# Patient Record
Sex: FEMALE | Race: WHITE | NOT HISPANIC OR LATINO | Employment: UNEMPLOYED | URBAN - METROPOLITAN AREA
[De-identification: names, ages, dates, MRNs, and addresses within clinical notes are randomized per-mention and may not be internally consistent; named-entity substitution may affect disease eponyms.]

---

## 2017-04-10 ENCOUNTER — CONVERSION ENCOUNTER (OUTPATIENT)
Dept: GENERAL RADIOLOGY | Facility: HOSPITAL | Age: 47
End: 2017-04-10

## 2017-04-12 ENCOUNTER — CONVERSION ENCOUNTER (OUTPATIENT)
Dept: ULTRASOUND IMAGING | Facility: HOSPITAL | Age: 47
End: 2017-04-12

## 2018-02-28 ENCOUNTER — OFFICE VISIT CONVERTED (OUTPATIENT)
Dept: ORTHOPEDIC SURGERY | Facility: CLINIC | Age: 48
End: 2018-02-28
Attending: ORTHOPAEDIC SURGERY

## 2018-07-25 ENCOUNTER — CONVERSION ENCOUNTER (OUTPATIENT)
Dept: GENERAL RADIOLOGY | Facility: HOSPITAL | Age: 48
End: 2018-07-25

## 2019-02-17 ENCOUNTER — HOSPITAL ENCOUNTER (OUTPATIENT)
Dept: URGENT CARE | Facility: CLINIC | Age: 49
Discharge: HOME | End: 2019-02-17
Attending: PHYSICIAN ASSISTANT

## 2019-06-21 ENCOUNTER — OFFICE VISIT CONVERTED (OUTPATIENT)
Dept: ORTHOPEDIC SURGERY | Facility: CLINIC | Age: 49
End: 2019-06-21
Attending: ORTHOPAEDIC SURGERY

## 2019-08-01 ENCOUNTER — HOSPITAL ENCOUNTER (OUTPATIENT)
Dept: PERIOP | Facility: HOSPITAL | Age: 49
Setting detail: HOSPITAL OUTPATIENT SURGERY
Discharge: HOME | End: 2019-08-01
Attending: ORTHOPAEDIC SURGERY

## 2019-08-01 LAB — HCG UR QL: NEGATIVE

## 2019-08-14 ENCOUNTER — OFFICE VISIT CONVERTED (OUTPATIENT)
Dept: ORTHOPEDIC SURGERY | Facility: CLINIC | Age: 49
End: 2019-08-14
Attending: PHYSICIAN ASSISTANT

## 2019-08-15 ENCOUNTER — HOSPITAL ENCOUNTER (OUTPATIENT)
Dept: OTHER | Facility: HOSPITAL | Age: 49
Setting detail: RECURRING SERIES
Discharge: HOME | End: 2019-10-08
Attending: ORTHOPAEDIC SURGERY

## 2019-09-18 ENCOUNTER — OFFICE VISIT CONVERTED (OUTPATIENT)
Dept: ORTHOPEDIC SURGERY | Facility: CLINIC | Age: 49
End: 2019-09-18
Attending: PHYSICIAN ASSISTANT

## 2019-09-25 ENCOUNTER — HOSPITAL ENCOUNTER (OUTPATIENT)
Dept: GENERAL RADIOLOGY | Facility: HOSPITAL | Age: 49
Discharge: HOME | End: 2019-09-25
Attending: PHYSICIAN ASSISTANT

## 2019-10-14 ENCOUNTER — OFFICE VISIT CONVERTED (OUTPATIENT)
Dept: ORTHOPEDIC SURGERY | Facility: CLINIC | Age: 49
End: 2019-10-14
Attending: ORTHOPAEDIC SURGERY

## 2020-04-22 ENCOUNTER — OFFICE VISIT CONVERTED (OUTPATIENT)
Dept: CARDIOLOGY | Facility: CLINIC | Age: 50
End: 2020-04-22
Attending: INTERNAL MEDICINE

## 2020-05-07 ENCOUNTER — HOSPITAL ENCOUNTER (OUTPATIENT)
Dept: OTHER | Facility: HOSPITAL | Age: 50
Discharge: HOME | End: 2020-05-07
Attending: NURSE PRACTITIONER

## 2020-05-07 LAB — CORTIS AM PEAK SERPL-MCNC: 9.6 UG/DL (ref 6–18.4)

## 2020-05-11 ENCOUNTER — HOSPITAL ENCOUNTER (OUTPATIENT)
Dept: GENERAL RADIOLOGY | Facility: HOSPITAL | Age: 50
Discharge: HOME | End: 2020-05-11
Attending: INTERNAL MEDICINE

## 2020-06-16 ENCOUNTER — HOSPITAL ENCOUNTER (OUTPATIENT)
Dept: GENERAL RADIOLOGY | Facility: HOSPITAL | Age: 50
Discharge: HOME | End: 2020-06-16
Attending: OBSTETRICS & GYNECOLOGY

## 2020-10-28 ENCOUNTER — OFFICE VISIT CONVERTED (OUTPATIENT)
Dept: CARDIOLOGY | Facility: CLINIC | Age: 50
End: 2020-10-28
Attending: INTERNAL MEDICINE

## 2021-05-01 ENCOUNTER — HOSPITAL ENCOUNTER (OUTPATIENT)
Dept: URGENT CARE | Facility: CLINIC | Age: 51
Discharge: HOME | End: 2021-05-01
Attending: EMERGENCY MEDICINE

## 2021-05-10 NOTE — PROCEDURES
Procedure Note      Patient Name: Naima Perkins   Patient ID: [id number]   Sex: Female   YOB: 1970    Primary Care Provider: Fred Galvez MD   Referring Provider: Fred Galvez MD    Visit Date: April 22, 2020    Provider: Eduin Segura MD   Location: [facility]   Location Address: [address] [place] [address], [facility]   [place]  [id number]   Location Phone: [phone]          FINAL REPORT   HOLTER MONITOR REPORT  Date: 04/22/2020   Indication: Symptomatic palpitations      The patient underwent 24 hours of Holter monitoring. 100% of the data was analyzable.  Minimum heart rate of 46 beats per minute occurred at 2:39 AM.  Maximum heart rate of 101 beats per minute occurred at 7:03 PM.  Average heart rate was 66 beats per minute.  There were 18 PVCs and 32 PACs.  No two-second pauses were noted.  Underlying rhythm was sinus. The patient cycled the monitor on four different occasions.  At 4:34 PM patient complained of heart beating hard while standing, and the underlying rhythm was sinus with a heart rate of 65 beats per minute and no arrhythmias.  The patient complained of having palpitations at 7:58 PM.  The underlying rhythm was sinus with a heart rate of 75 beats per minute.  The patient complained of palpitations while sitting at 8:50 PM at which time the underlying rhythm was sinus with a mean heart rate of 60 beats per minute.  The patient complained of mild chest pain while lying down at 11:00 PM at which time the underlying rhythm was sinus with a heart rate of 75 beats per minute and no significant tachy- or bradyarrhythmias noted.      CONCLUSION:    1.  Twenty-four hours of Holter monitor demonstrates sinus rhythm with no significant tachy- or        bradyarrhythmias noted.   2.  No correlation of symptoms with any Holter abnormality.         Eduin Segura MD, [facility]  PM/pap    This note was transcribed by Kathy Koroma.  pap/pm  The above service was  transcribed by Kathy Koroma, and I attest to the accuracy of the note.  PM                 Electronically Signed by: Hortencia Koroma-, Other -Author on May 7, 2020 03:10:19 PM  Electronically Co-signed by: Eduin Segura MD -Reviewer on May 8, 2020 09:27:23 AM

## 2021-05-10 NOTE — H&P
History and Physical      Patient Name: Naima Perkins   Patient ID: [id number]   Sex: Female   YOB: 1970    Primary Care Provider: Fred Galvez MD   Referring Provider: Fred Galvez MD    Visit Date: April 22, 2020    Provider: Eduin Segura MD   Location: [facility]   Location Address: [address] [place] [address], [facility]   [place]  [id number]   Location Phone: [phone]          Chief Complaint  · Palpitations       History Of Present Illness  Consult requested by: Fred Galvez MD   Naima Perkins is a 49 year old /White female who has not been in the office in 3-1/2 years now and desires to be seen by me again. She complains of palpitations over the last few months described as her heart beating 'hard' and feels it is fast at times. She also has a fluttery sensation and 'PVCs.' She denies any chest pain or pressure. No swelling or shortness of breath. She does have dizziness when she gets up too fast and fatigue when her heart 'acts up.' She was told at her last office visit 8 years ago to increase her salt intake, but she has not done so. She had a particularly bad episode of feeling the palpitations and uncomfortable on February 9, so she went to the emergency room, which initial workup was negative.   PAST MEDICAL HISTORY: Positive for heart murmur, arthritis and irregular heartbeat.   PAST HOSPITALIZATIONS/SURGERIES: include a colonoscopy, breast biopsy, right wrist issues and C. diff.   FAMILY HISTORY: Positive for diabetes. Negative for hypertension and heart disease.   CURRENT MEDICATIONS: include Prozac 20 mg daily; Alavert 10 mg daily; Florastor 250 mg daily; Vitamin D3 2000 units daily. The dosage and frequency of the medications were reviewed with the patient.   CHOLESTEROL STATUS: Unknown.   PSYCHOSOCIAL HISTORY: She is positive for mood changes, depression and anxiety. She is . She drinks one cup of coffee with [employer]  "daily. She rarely has any alcohol. She used to smoke 10 cigarettes to a pack a day for 10 years and stopped 7 or 8 years ago. She walks daily for exercise.   ALLERGIES: Metronidazole, Paxil, oral steroids, Flonase, Augmentin, Latex.       Review of Systems  · Constitutional  o Admits  o : Overall she feels like her health is fair. She is positive for fatigue whenever her heart 'acts up.'  o Denies  o : Recent weight changes  · Eyes  o Denies  o : double vision  · HENT  o Denies  o : hearing loss or ringing, chronic sinus problem, swollen glands in neck  · Cardiovascular  o Admits  o : Palpitations and PVCs.  o Denies  o : chest pain, swelling (feet, ankles, hands), shortness of breath while walking or lying flat  · Respiratory  o Denies  o : shortness of breath, asthma or wheezing, COPD  · Gastrointestinal  o Denies  o : ulcers, nausea or vomiting  · Neurologic  o Admits  o : Dizziness with position changes.  o Denies  o : lightheaded, stroke, headaches  · Musculoskeletal  o Admits  o : joint pain, back pain  · Endocrine  o Admits  o : heat or cold intolerance  o Denies  o : thyroid disease, diabetes, excessive thirst or urination  · Heme-Lymph  o Denies  o : bleeding or bruising tendency, anemia      Vitals  Date Time BP Position Site L\R Cuff Size HR RR TEMP (F) WT  HT  BMI kg/m2 BSA m2 O2 Sat        04/22/2020 10:25 /56 Sitting    60 - R   136lbs 0oz 5'  5\" 22.63 1.68           Physical Examination  · Constitutional  o Appearance  o : Awake, alert, in no acute distress.  · Eyes  o Conjunctivae  o : Conjunctivae normal.  o Pupils and Irises  o : Fundi exam not performed. Pupils are equal and reactive to light.  · Ears, Nose, Mouth and Throat  o Oral Cavity  o :   § Oral Mucosa  § : Normal oral mucosa.  · Neck  o Inspection/Palpation  o : No JVD. No bruits noted. No lymphadenopathy. Good carotid upstroke.  · Respiratory  o Respiratory  o : Good respiratory effort. Clear to percussion and " auscultation.  · Cardiovascular  o Heart  o : PMI is normal. S1, S2 regular. No S3. No S4. Negative systolic/diastolic murmurs. No ectopic beats.  o Peripheral Vascular System  o :   § Extremities  § : Good femoral and pedal pulses. No pedal edema.  · Gastrointestinal  o Abdominal Examination  o : Soft with a prominent abdominal pulsation. No masses felt. Negative hepatosplenomegaly. She has good bowel sounds.  · Musculoskeletal  o General  o : Muscle strength is normal with normal tone.  · Skin and Subcutaneous Tissue  o General Inspection  o : Within normal limits.     Labs from February were reviewed, including a thyroid, which was normal.    EKG was performed in the office today.  Indication:       Palpitations.  Results:          sinus rhythm at a rate of 59.  Comparison:   This is unchanged compared to her EKG in the emergency room on February 9th.             Assessment     1.  Symptomatic palpitations.  2.  Hypotension.  3.  Prominent abdominal pulsations.       Plan     1.  Will do an ultrasound of the aorta in view of her abdominal pulsations.  2.  Will do a Holter to evaluate for any arrhythmias.  3.  Ideally we would like to give her a beta blocker to help with her palpitations.  However, she is hypotensive,       and that will worsen it.  4.  Encouraged her to liberally add salt to her diet and use over-the-counter compression hose.  5.  Will do an AM cortisol.  6.  Rest of treatment is based on results of the above.  7.  Will follow up in 6 months or earlier if needed.    Eduin Sneed M.D., [facility]  Naomie/vanessa           This note was transcribed by Bebe Clemons.  vanessa/naomie  The above service was transcribed by Bebe Clemons, and I attest to the accuracy of the note.  JF/PMM               Electronically Signed by: Bebe Clemons-, -Author on April 28, 2020 06:56:26 AM  Electronically Co-signed by: SUSI Katz -Reviewer on April 30, 2020 09:00:11 AM  Electronically  Co-signed by: Eduin Segura MD -Reviewer on May 5, 2020 03:13:50 PM

## 2021-05-13 NOTE — PROGRESS NOTES
"   Progress Note      Patient Name: Naima Perkins   Patient ID: [id number]   Sex: Female   YOB: 1970    Primary Care Provider: Fred Galvez MD   Referring Provider: Fred Galvez MD    Visit Date: October 28, 2020    Provider: Eduin Segura MD   Location: [facility]   Location Address: [address] [place] [address], [address]   [place]  [id number]   Location Phone: [phone]          Chief Complaint  · Irregular heartbeat   · Tiredness  · Dizziness       History Of Present Illness  REFERRING PROVIDER: Fred Galvez MD   Naima Perkins is a 50-year-old female with a longstanding history of palpitations and dizziness, who states that she was doing up until a month or so ago when she started having episodes of palpitations and dizziness with near-syncope on and off. When the palpitations come, she feels funny in her chest, which lasts for a few seconds to several minutes. She denies any exertional chest pain. Whenever she does exercise, she does not have exertional chest pain. She has had episodes of lightheadedness with near-syncope on occasion.   PAST MEDICAL HISTORY: Heart murmur; arthritis ; irregular heartbeat.   PSYCHOSOCIAL HISTORY: She rarely drinks alcohol. She previously used tobacco but quit.   CURRENT MEDICATIONS: include Prozac 20 mg daily; Florastor 250 mg daily; Alavert 10 mg daily. The dosage and frequency of the medications were reviewed with the patient.       Review of Systems  · Cardiovascular  o Admits  o : palpitations (fast, fluttering, or skipping beats), shortness of breath while walking or lying flat, chest pain or angina pectoris   o Denies  o : swelling (feet, ankles, hands)  · Respiratory  o Denies  o : chronic or frequent cough      Vitals  Date Time BP Position Site L\R Cuff Size HR RR TEMP (F) WT  HT  BMI kg/m2 BSA m2 O2 Sat FR L/min FiO2 HC       10/28/2020 02:10 PM 94/44 Sitting    60 - R   137lbs 0oz 5'  5\" 22.8 1.69             Physical " Examination  · Constitutional  o Appearance  o : Awake, alert, in no acute distress.  · Eyes  o Conjunctivae  o : Conjunctivae normal.  · Ears, Nose, Mouth and Throat  o Oral Cavity  o :   § Oral Mucosa  § : Normal.  · Neck  o Inspection/Palpation/Auscultation  o : No lymphadenopathy. No JVD. No bruit. Good carotid upstroke.  · Respiratory  o Respiratory  o : Clear to percussion and auscultation. Good respiratory effort.  · Cardiovascular  o Heart  o : PMI is normal. S1, S2 regular. No S3. No S4. Negative systolic/diastolic murmurs. No ectopic beats.   o Peripheral Vascular System  o :   § Extremities  § : Good femoral and pedal pulses. No pedal edema.  · Gastrointestinal  o Abdominal Examination  o : Soft. No masses or tenderness felt. No hepatosplenomegaly. Abdominal aorta is not palpable.          Assessment     1.  Intermittent palpitations.  2.  Dizziness with episodes of near-syncope.  3.  Low blood pressure.       Plan     I have suggested to her that she continue her program of liberal use of salt and good hydration.  In addition, I have suggested that she change her morning coffee intake to decaf.  In addition, I have suggested that she   should try Florinef 0.1 mg every other day to see if it will help her with her low blood pressure, dizziness and near-syncope.  I have suggested that she increase potassium-rich foods in her diet.  Will check a BMP and a mag in 3 to 4 weeks and before her appointment in 6 months.    Eduin Segura M.D., [facility]  pmm/vanessa           This note was transcribed by Bebe Clemons.  dmd/pmm  The above service was transcribed by Bebe Clemons, and I attest to the accuracy of the note.  PMM                   Electronically Signed by: Bebe Clemons-, -Author on November 3, 2020 05:35:16 AM  Electronically Co-signed by: Eduin Segura MD -Reviewer on November 8, 2020 05:57:39 PM

## 2021-05-14 VITALS
WEIGHT: 137 LBS | BODY MASS INDEX: 22.82 KG/M2 | HEART RATE: 60 BPM | HEIGHT: 65 IN | DIASTOLIC BLOOD PRESSURE: 44 MMHG | SYSTOLIC BLOOD PRESSURE: 94 MMHG

## 2021-05-15 VITALS
HEART RATE: 60 BPM | WEIGHT: 136 LBS | DIASTOLIC BLOOD PRESSURE: 56 MMHG | SYSTOLIC BLOOD PRESSURE: 100 MMHG | HEIGHT: 65 IN | BODY MASS INDEX: 22.66 KG/M2

## 2021-05-15 VITALS — OXYGEN SATURATION: 98 % | BODY MASS INDEX: 22.82 KG/M2 | HEART RATE: 63 BPM | WEIGHT: 137 LBS | HEIGHT: 65 IN

## 2021-05-15 VITALS — HEIGHT: 65 IN | WEIGHT: 137 LBS | BODY MASS INDEX: 22.82 KG/M2 | HEART RATE: 76 BPM | OXYGEN SATURATION: 96 %

## 2021-05-15 VITALS — OXYGEN SATURATION: 99 % | WEIGHT: 144.5 LBS | HEIGHT: 65 IN | BODY MASS INDEX: 24.07 KG/M2 | HEART RATE: 58 BPM

## 2021-05-15 VITALS — OXYGEN SATURATION: 99 % | WEIGHT: 138 LBS | HEART RATE: 60 BPM | BODY MASS INDEX: 22.99 KG/M2 | HEIGHT: 65 IN

## 2021-05-16 VITALS — BODY MASS INDEX: 22.99 KG/M2 | HEIGHT: 65 IN | WEIGHT: 138 LBS | OXYGEN SATURATION: 98 % | HEART RATE: 66 BPM

## 2021-12-14 ENCOUNTER — APPOINTMENT (OUTPATIENT)
Dept: GENERAL RADIOLOGY | Facility: HOSPITAL | Age: 51
End: 2021-12-14

## 2021-12-14 ENCOUNTER — HOSPITAL ENCOUNTER (EMERGENCY)
Facility: HOSPITAL | Age: 51
Discharge: HOME | End: 2021-12-14
Attending: EMERGENCY MEDICINE | Admitting: EMERGENCY MEDICINE

## 2021-12-14 VITALS
WEIGHT: 141.98 LBS | DIASTOLIC BLOOD PRESSURE: 49 MMHG | HEIGHT: 65 IN | SYSTOLIC BLOOD PRESSURE: 108 MMHG | BODY MASS INDEX: 23.65 KG/M2 | TEMPERATURE: 97.4 F | RESPIRATION RATE: 22 BRPM | HEART RATE: 82 BPM | OXYGEN SATURATION: 100 %

## 2021-12-14 DIAGNOSIS — Z98.890 HISTORY OF HAND SURGERY: Primary | ICD-10-CM

## 2021-12-14 DIAGNOSIS — M25.431 PAIN AND SWELLING OF RIGHT WRIST: ICD-10-CM

## 2021-12-14 DIAGNOSIS — M25.531 PAIN AND SWELLING OF RIGHT WRIST: ICD-10-CM

## 2021-12-14 PROCEDURE — 96372 THER/PROPH/DIAG INJ SC/IM: CPT

## 2021-12-14 PROCEDURE — 73110 X-RAY EXAM OF WRIST: CPT

## 2021-12-14 PROCEDURE — 63710000001 ONDANSETRON ODT 4 MG TABLET DISPERSIBLE: Performed by: PHYSICIAN ASSISTANT

## 2021-12-14 PROCEDURE — 25010000002 KETOROLAC TROMETHAMINE PER 15 MG: Performed by: PHYSICIAN ASSISTANT

## 2021-12-14 PROCEDURE — 99283 EMERGENCY DEPT VISIT LOW MDM: CPT

## 2021-12-14 RX ORDER — ONDANSETRON 4 MG/1
4 TABLET, ORALLY DISINTEGRATING ORAL ONCE
Status: COMPLETED | OUTPATIENT
Start: 2021-12-14 | End: 2021-12-14

## 2021-12-14 RX ORDER — KETOROLAC TROMETHAMINE 10 MG/1
10 TABLET, FILM COATED ORAL EVERY 6 HOURS PRN
Qty: 12 TABLET | Refills: 0 | Status: SHIPPED | OUTPATIENT
Start: 2021-12-14 | End: 2022-07-18

## 2021-12-14 RX ORDER — ZINC SULFATE 50(220)MG
220 CAPSULE ORAL DAILY
COMMUNITY

## 2021-12-14 RX ORDER — ONDANSETRON 4 MG/1
4 TABLET, ORALLY DISINTEGRATING ORAL 4 TIMES DAILY PRN
Qty: 12 TABLET | Refills: 0 | Status: SHIPPED | OUTPATIENT
Start: 2021-12-14 | End: 2022-07-19

## 2021-12-14 RX ORDER — DICLOFENAC SODIUM AND MISOPROSTOL 75; 200 MG/1; UG/1
1 TABLET, DELAYED RELEASE ORAL 2 TIMES DAILY
COMMUNITY
End: 2022-07-18

## 2021-12-14 RX ORDER — KETOROLAC TROMETHAMINE 30 MG/ML
30 INJECTION, SOLUTION INTRAMUSCULAR; INTRAVENOUS ONCE
Status: COMPLETED | OUTPATIENT
Start: 2021-12-14 | End: 2021-12-14

## 2021-12-14 RX ADMIN — ONDANSETRON 4 MG: 4 TABLET, ORALLY DISINTEGRATING ORAL at 20:49

## 2021-12-14 RX ADMIN — KETOROLAC TROMETHAMINE 30 MG: 30 INJECTION, SOLUTION INTRAMUSCULAR; INTRAVENOUS at 20:45

## 2021-12-15 NOTE — ED PROVIDER NOTES
"Subjective   51-year-old female presents to the emergency department with complaints of right wrist pain and swelling that occurred gradually over the last 2 weeks.  She reports she is having severe pain, 9 out of 10, worse with range of motion exercises, better at rest.  Sensation grossly intact.  Neurovascularly intact.  She reports having surgery approximately 8 weeks ago by orthopedic surgeon outlying facility.  She reports having proximal carpectomy performed, with concern of \"something is in there that should not be.\"  Patient stable, no acute distress.  Vitals normal.          Review of Systems   Constitutional: Negative for chills and fever.   HENT: Negative for congestion, ear pain and sore throat.    Eyes: Negative for pain.   Respiratory: Negative for cough, chest tightness and shortness of breath.    Cardiovascular: Negative for chest pain.   Gastrointestinal: Negative for abdominal pain, diarrhea, nausea and vomiting.   Genitourinary: Negative for flank pain and hematuria.   Musculoskeletal: Negative for joint swelling.   Skin: Negative for pallor.   Neurological: Negative for seizures and headaches.   All other systems reviewed and are negative.      Past Medical History:   Diagnosis Date   • Anxiety    • Breast lump    • De Quervain's disease (tenosynovitis) 02/28/2018    Right   • Depression    • IBS (irritable bowel syndrome)    • PVC (premature ventricular contraction)    • Seasonal allergies        Allergies   Allergen Reactions   • Amoxicillin-Pot Clavulanate Hives   • Flonase [Fluticasone] Swelling   • Latex Hives   • Metronidazole Hives   • Paxil [Paroxetine] Hives   • Prednisone Anxiety     All oral and IM steroids   • Wellbutrin [Bupropion] Anxiety       Past Surgical History:   Procedure Laterality Date   • ARTHROPLASTY      Surgical Clips   • BREAST SURGERY     • COLONOSCOPY  07/2016   • FOOT SURGERY Left        Family History   Problem Relation Age of Onset   • Cancer Mother         " Unspecified   • Osteoporosis Mother    • Breast cancer Other    • Diabetes Son         Unspecified type       Social History     Socioeconomic History   • Marital status:    Tobacco Use   • Smoking status: Former Smoker   • Smokeless tobacco: Never Used   Vaping Use   • Vaping Use: Never used   Substance and Sexual Activity   • Alcohol use: Yes     Comment: occ   • Drug use: Never           Objective   Physical Exam  Vitals and nursing note reviewed.   Constitutional:       General: She is not in acute distress.     Appearance: Normal appearance. She is not toxic-appearing.   HENT:      Head: Normocephalic and atraumatic.      Mouth/Throat:      Mouth: Mucous membranes are moist.   Eyes:      General: No scleral icterus.     Pupils: Pupils are equal, round, and reactive to light.   Cardiovascular:      Rate and Rhythm: Normal rate and regular rhythm.      Pulses: Normal pulses.      Heart sounds: Normal heart sounds.   Pulmonary:      Effort: Pulmonary effort is normal. No respiratory distress.      Breath sounds: Normal breath sounds.   Abdominal:      General: Abdomen is flat.      Palpations: Abdomen is soft.      Tenderness: There is no abdominal tenderness.   Musculoskeletal:         General: Normal range of motion.        Arms:       Cervical back: Normal range of motion and neck supple.      Comments: Gross sensation intact.  Neurovascularly intact.   Skin:     General: Skin is warm and dry.   Neurological:      Mental Status: She is alert and oriented to person, place, and time. Mental status is at baseline.         Procedures           ED Course                                                 MDM  Number of Diagnoses or Management Options  History of hand surgery  Pain and swelling of right wrist  Diagnosis management comments: Patient was seen and evaluated here in the emergency department for pain and swelling of the right wrist postsurgical carpectomy.  Imaging at this time did not reveal any  acute fractures or misalignments.  Patient was encouraged to follow-up with orthopedic surgeon regarding concerns, she verbalized understanding and agreement with this treatment plan.  Pain was controlled prior to discharge.  Patient was discharged home on pain medications to continue taking until follow-up.  Patient was offered splint to remain immobilized until follow-up, she deferred at this time.  Patient was given secondary referral for second opinion regarding surgical fixation.  No signs of fracture or infection present at this time.  No further management warranted here in the emergency department.  Further management and imaging can be performed by outlying facility with surgeon who performed operation.  Patient stable, no acute distress.  No other questions or concerns identified.       Amount and/or Complexity of Data Reviewed  Tests in the radiology section of CPT®: reviewed and ordered    Risk of Complications, Morbidity, and/or Mortality  Presenting problems: low  Diagnostic procedures: low  Management options: low    Patient Progress  Patient progress: stable      Final diagnoses:   History of hand surgery   Pain and swelling of right wrist       ED Disposition  ED Disposition     ED Disposition Condition Comment    Discharge Stable           Fred Galvez MD  [address] [place], [place] [id number] [phone]      As needed, If symptoms worsen         Medication List      New Prescriptions    ketorolac 10 MG tablet  Commonly known as: TORADOL  Take 1 tablet by mouth Every 6 (Six) Hours As Needed for Moderate Pain .     ondansetron ODT 4 MG disintegrating tablet  Commonly known as: ZOFRAN-ODT  Place 1 tablet under the tongue 4 (Four) Times a Day As Needed for Nausea or Vomiting.           Where to Get Your Medications      These medications [facility] to [id number] - [place], [place] - [address] AT [facility] [phone] [facility] [phone]   [address]  [place] [address]    Hours: 24-hours Phone: [phone]   · ketorolac 10 MG tablet  · ondansetron ODT 4 MG disintegrating tablet          Eliana Hamilton, [facility]  12/14/21 0712

## 2021-12-23 ENCOUNTER — TREATMENT (OUTPATIENT)
Dept: PHYSICAL THERAPY | Facility: CLINIC | Age: 51
End: 2021-12-23

## 2021-12-23 DIAGNOSIS — M25.531 RIGHT WRIST PAIN: ICD-10-CM

## 2021-12-23 DIAGNOSIS — Z98.890 STATUS POST PROXIMAL ROW CARPECTOMY OF WRIST: Primary | ICD-10-CM

## 2021-12-23 DIAGNOSIS — M25.631 STIFFNESS OF RIGHT WRIST JOINT: ICD-10-CM

## 2021-12-23 PROCEDURE — 97112 NEUROMUSCULAR REEDUCATION: CPT | Performed by: OCCUPATIONAL THERAPIST

## 2021-12-23 PROCEDURE — 97110 THERAPEUTIC EXERCISES: CPT | Performed by: OCCUPATIONAL THERAPIST

## 2021-12-23 PROCEDURE — 97167 OT EVAL HIGH COMPLEX 60 MIN: CPT | Performed by: OCCUPATIONAL THERAPIST

## 2021-12-23 NOTE — PROGRESS NOTES
Outpatient Occupational Therapy Ortho Initial Evaluation    Patient: Naima Perkins   : 1970  Diagnosis/ICD-10 Code:  Status post proximal row carpectomy of wrist [Z98.890]  Referring practitioner: Rigoberto Taylor MD  Date of Initial Visit: 2021  Today's Date: 2021  Patient seen for 1 sessions               Subjective Questionnaire: QuickDASH: 42      Subjective Evaluation    History of Present Illness  Date of surgery: 10/22/2021  Mechanism of injury: Has had pain in R wrist for several years.  Got a steroid injection, and had a steroid storm from the injection.  Continued pain.  Had proximal row carpectomy 10/21/21      Patient Occupation: Nurse   Precautions and Work Restrictions: currently off dutyQuality of life: excellent    Pain  Current pain ratin  At best pain ratin  Location: R wrist   Quality: dull ache, sharp, pressure and tight (sore )  Relieving factors: ice, heat and medications  Aggravating factors: lifting  Progression: improved    Social Support  Lives in: one-story house  Lives with: spouse and young children    Hand dominance: right    Treatments  Previous treatment: immobilization and injection treatment  Patient Goals  Patient goals for therapy: decreased edema, increased motion, decreased pain and return to work           Past Medical hx: no significant medical hx     LATEX ALLERGY    Edema glove R X-small issued for night use.     Objective          Neurological Testing     Sensation     Wrist/Hand     Right   Intact: light touch    Comments   Right light touch: 2.83 SW all digits; hypersensitive    Active Range of Motion     Right Wrist   Wrist flexion: 30 degrees   Wrist extension: 40 degrees   Radial deviation: 10 degrees   Ulnar deviation: 10 degrees     Additional Active Range of Motion Details  Full fist, reports pain and stiffness    Strength/Myotome Testing     Left Wrist/Hand      (2nd hand position)     Comments: R not tested secondary to  pain  lbs: 45    Swelling     Right Wrist/Hand   Circumference wrist: 16.2 cm          Assessment & Plan     Assessment  Impairments: abnormal coordination, abnormal muscle firing, abnormal or restricted ROM, activity intolerance, impaired physical strength, lacks appropriate home exercise program, pain with function, safety issue and weight-bearing intolerance  Functional Limitations: carrying objects, lifting, pulling, pushing, reaching behind back, reaching overhead and unable to perform repetitive tasks  Assessment details: Pt is hypersensitive to touch, movement, and use of R UE.  Pt reports pain can elevate up to 9-10/10 and makes her nauseated at times.  Pt's goal is to be pain free and return to work. Pt has moderate swelling under the scar tissue, and neural irritation at radial and ulnar nerve path at wrist level.  Pt reports stiffness and swelling worse in the mornings. Pt is no longer wearing brace day or night.   Prognosis: good    Goals  Plan Goals: 1. The patient complains of pain in the R wrist                  LTG 1: 12 weeks:  The patient will report a pain rating of 0/10 or better in order to improve sleep quality and tolerance to performance of activities of daily living.                                  STATUS:  New                  STG 1a: 4weeks:  The patient will report a pain rating of 6/10 or better.                                   STATUS:  New  2. The patient has limited ROM of the R wrist                  LTG 2: 12 weeks:  The patient will demonstrate 100 degrees of KAMINSKI of R wrist to allow the patient to weight bear and write.                                  STATUS:  New                   STG 2a: 4 weeks:  The patient will demonstrate 75 degrees of KAMINSKI.                                  STATUS:  New                             3. The patient has limited strength of the R UE.                  LTG 3: 12 weeks:  The patient will demonstrate 30# in order to return to functional lifting and  opening jars.                                  STATUS:  New                  STG 3a: 8 weeks:  The patient will demonstrate tolerance of light strengthening without adverse reaction.                                  STATUS:  New  4. Carrying, Moving, and Handling Objects Functional Limitation                                   LTG 4: 12 weeks:  The patient will demonstrate 1-19% limitation by achieving a score of 15 on the Quick DASH.                                  STATUS:  New                  STG 4a: 4 weeks:  The patient will demonstrate 40-59% limitation by achieving a score of 35 on the Quick DASH.                                    STATUS:  New                    TREATMENT: Orthotic fabrication/fitting/management and training, Manual therapy, therapeutic exercise, home exercise instruction, and modalities as needed to include: electrical stimulation, ultrasound, moist heat, paraffin, fluidotherapy and ice.      Plan  Planned modality interventions: TENS, thermotherapy (hydrocollator packs), thermotherapy (paraffin bath), ultrasound and electrical stimulation/Russian stimulation  Other planned modality interventions: fluidotherapy  Planned therapy interventions: manual therapy, ADL retraining, motor coordination training, neuromuscular re-education, soft tissue mobilization, fine motor coordination training, body mechanics training, balance/weight-bearing training, functional ROM exercises, flexibility, spinal/joint mobilization, strengthening, stretching, therapeutic activities, IADL retraining, joint mobilization and home exercise program  Frequency: 2x week  Duration in weeks: 12  Treatment plan discussed with: patient        Patient is indicated for skilled occupational therapy services.    History # of Personal Factors and/or Comorbidities: HIGH (3+)  Examination of Body System(s): # of elements: HIGH (4+)  Clinical Presentation: EVOLVING  Clinical Decision Making: HIGH     Evaluation:  Low Complexity:     0     mins  04836;  Mod Complexity:    0     mins  47013;  High Complexity:    25     mins  82215;    Timed:  Manual Therapy:    5     mins  08988;  Therapeutic Exercise:    10     mins  80037;  Therapeutic Activity:    0     mins  27921;     Neuromuscular Lei:    10    mins  38949;    Ultrasound:     0     mins  23973;    Electrical Stimulation:    0     mins  32221;    Untimed:  Electrical Stimulation:    0     mins  31229 ( );  Fluidotherapy:        10    mins  56633  Paraffin:                          0    mins  76831    Timed Treatment:   25   mins   Total Treatment:     60   mins      OT SIGNATURE: WAYLON Hoyos, OTR/L, CHT     Electronically signed    [place] LICENSE: [id number]   DATE TREATMENT INITIATED: 12/23/2021    Initial Certification  Certification Period: 12/23/2021 thru 3/22/2022  I certify that the therapy services are furnished while this patient is under my care.  The services outlined above are required by this patient, and will be reviewed every 90 days.     PHYSICIAN: Rigoberto Taylor MD      DATE:     Please sign and return via fax to [phone]   Thank you, [facility] Occupational Therapy.

## 2022-01-04 ENCOUNTER — TREATMENT (OUTPATIENT)
Dept: PHYSICAL THERAPY | Facility: CLINIC | Age: 52
End: 2022-01-04

## 2022-01-04 DIAGNOSIS — Z98.890 STATUS POST PROXIMAL ROW CARPECTOMY OF WRIST: ICD-10-CM

## 2022-01-04 DIAGNOSIS — M25.631 STIFFNESS OF RIGHT WRIST JOINT: ICD-10-CM

## 2022-01-04 DIAGNOSIS — M25.531 RIGHT WRIST PAIN: Primary | ICD-10-CM

## 2022-01-04 PROCEDURE — 97530 THERAPEUTIC ACTIVITIES: CPT | Performed by: OCCUPATIONAL THERAPIST

## 2022-01-04 PROCEDURE — 97140 MANUAL THERAPY 1/> REGIONS: CPT | Performed by: OCCUPATIONAL THERAPIST

## 2022-01-04 PROCEDURE — 97110 THERAPEUTIC EXERCISES: CPT | Performed by: OCCUPATIONAL THERAPIST

## 2022-01-04 PROCEDURE — 97112 NEUROMUSCULAR REEDUCATION: CPT | Performed by: OCCUPATIONAL THERAPIST

## 2022-01-04 NOTE — PROGRESS NOTES
Occupational Therapy Daily Treatment Note      Patient: Naima Perkins   : 1970  Referring practitioner: No ref. provider found  Date of Initial Visit: Type: THERAPY  Noted: 2021  Today's Date: 2022  Patient seen for 2 sessions    ICD-10-CM ICD-9-CM   1. Right wrist pain  M25.531 719.43   2. Stiffness of right wrist joint  M25.631 719.53   3. Status post proximal row carpectomy of wrist  Z98.890 V45.89          Naima Perkins reports I have been hurting changed to pure vitamin E oil and that helped. I still wake up every morning with swelling.     Objective   See Exercise, Manual, and Modality Logs for complete treatment.   Wrist [id number]  Wrist ext 45    Assessment/Plan  Scar pad made for night use.   Suction helped to reduce pain along radial and ulnar wrist. Pt noted to have adhesions and fibrotic restriction along radial and ulnar wrist.       Cont per POC           Timed:  Manual Therapy:    10     mins  34460;  Therapeutic Exercise:    10     mins  90479;  Therapeutic Activity:    8     mins  47437;     Neuromuscular Lei:    10    mins  82844;    Ultrasound:     0     mins  63769;    Electrical Stimulation:    0     mins  22198;    Untimed:  Electrical Stimulation:    0     mins  60806 ( );  Fluidotherapy:        0    mins  25164  Paraffin:                          0    mins  38828    Timed Treatment:   38   mins   Total Treatment:     38   mins    OT SIGNATURE: WAYLON Hoyos, MISTYR/L, CHT     Electronically signed    [place] LICENSE: [id number]

## 2022-01-12 ENCOUNTER — TREATMENT (OUTPATIENT)
Dept: PHYSICAL THERAPY | Facility: CLINIC | Age: 52
End: 2022-01-12

## 2022-01-12 DIAGNOSIS — Z98.890 STATUS POST PROXIMAL ROW CARPECTOMY OF WRIST: ICD-10-CM

## 2022-01-12 DIAGNOSIS — M25.631 STIFFNESS OF RIGHT WRIST JOINT: ICD-10-CM

## 2022-01-12 DIAGNOSIS — M25.531 RIGHT WRIST PAIN: Primary | ICD-10-CM

## 2022-01-12 PROCEDURE — 97110 THERAPEUTIC EXERCISES: CPT | Performed by: PHYSICAL THERAPIST

## 2022-01-12 PROCEDURE — 97530 THERAPEUTIC ACTIVITIES: CPT | Performed by: PHYSICAL THERAPIST

## 2022-01-12 PROCEDURE — 97022 WHIRLPOOL THERAPY: CPT | Performed by: PHYSICAL THERAPIST

## 2022-01-12 NOTE — PROGRESS NOTES
Occupational Therapy Daily Treatment Note      Patient: Naima Perkins   : 1970  Referring practitioner: No ref. provider found  Date of Initial Visit: Type: THERAPY  Noted: 2021  Today's Date: 2022  Patient seen for 3 sessions         Naima Perkins reports: it has been really bad the past 2 days, a little better today. My TENS should be here today. Still getting shooting pains.  says I may have CRPS and also ordered MRI for tomorrow.         Subjective Evaluation    Pain  Current pain ratin           Objective   See Exercise, Manual, and Modality Logs for complete treatment.       Assessment/Plan    Visit Diagnoses:    ICD-10-CM ICD-9-CM   1. Right wrist pain  M25.531 719.43   2. Stiffness of right wrist joint  M25.631 719.53   3. Status post proximal row carpectomy of wrist  Z98.890 V45.89     Pt has good response to fluidotherapy and textured desensitization. Continue per POC         Timed:  Manual Therapy:    5     mins  34797;  Therapeutic Exercise:    10    mins  86553;     Therapeutic Activity:    8     mins  15008;    Untimed:  Electrical Stimulation:    0     mins  19616 ( );  Fluidotherapy        10    mins  52208  Paraffin:                          0    mins  85335;    Timed Treatment:   23   mins   Total Treatment:     33   min    LADARIUS Curran/L  Occupational Therapist    Electronically signed   License number [id number]

## 2022-01-20 ENCOUNTER — TREATMENT (OUTPATIENT)
Dept: PHYSICAL THERAPY | Facility: CLINIC | Age: 52
End: 2022-01-20

## 2022-01-20 DIAGNOSIS — Z98.890 STATUS POST PROXIMAL ROW CARPECTOMY OF WRIST: ICD-10-CM

## 2022-01-20 DIAGNOSIS — M25.531 RIGHT WRIST PAIN: Primary | ICD-10-CM

## 2022-01-20 DIAGNOSIS — M25.631 STIFFNESS OF RIGHT WRIST JOINT: ICD-10-CM

## 2022-01-20 PROCEDURE — 97530 THERAPEUTIC ACTIVITIES: CPT | Performed by: OCCUPATIONAL THERAPIST

## 2022-01-20 PROCEDURE — 97110 THERAPEUTIC EXERCISES: CPT | Performed by: OCCUPATIONAL THERAPIST

## 2022-01-20 PROCEDURE — 97112 NEUROMUSCULAR REEDUCATION: CPT | Performed by: OCCUPATIONAL THERAPIST

## 2022-01-20 NOTE — PROGRESS NOTES
Re-Assessment / Re-Certification      Patient: Naima Perkins   : 1970  Diagnosis/ICD-10 Code:  Right wrist pain [M25.531]  Referring practitioner: Rigoberto Taylor MD  Date of Initial Visit: Type: THERAPY  Noted: 2021  Today's Date: 2022  Patient seen for 4 sessions      Subjective:   Naima Perkins reports:  I am a little sore today 3/10.  MRI on R wrist showed fluid and nerve compression.  I have a nerve conduction scheduled for 22   Subjective Questionnaire: QuickDASH: 39  Clinical Progress: improved  Home Program Compliance: Yes  Treatment has included: therapeutic exercise, neuromuscular re-education, manual therapy, therapeutic activity and electrical stimulation      Objective   Ext 50  Flex 40    Full fist    15#     kinesiotape applied to R wrist for carpal tunnel and scar tissue  Assessment/Plan    Visit Diagnoses:    ICD-10-CM ICD-9-CM   1. Right wrist pain  M25.531 719.43   2. Stiffness of right wrist joint  M25.631 719.53   3. Status post proximal row carpectomy of wrist  Z98.890 V45.89       Progress toward previous goals: Partially Met    Plan Goals: 1. The patient complains of pain in the R wrist                  LTG 1: 12 weeks:  The patient will report a pain rating of 0/10 or better in order to improve sleep quality and tolerance to performance of activities of daily living.                                  STATUS:  NOT MET                  STG 1a: 4weeks:  The patient will report a pain rating of 6/10 or better.                                   STATUS:  MET  2. The patient has limited ROM of the R wrist                  LTG 2: 12 weeks:  The patient will demonstrate 100 degrees of KAMINSKI of R wrist to allow the patient to weight bear and write.                                  STATUS:  NOT MET                  STG 2a: 4 weeks:  The patient will demonstrate 75 degrees of KAMINSKI.                                  STATUS:  MET                         3. The patient  has limited strength of the R UE.                  LTG 3: 12 weeks:  The patient will demonstrate 30# in order to return to functional lifting and opening jars.                                  STATUS:  NOT MET                  STG 3a: 8 weeks:  The patient will demonstrate tolerance of light strengthening without adverse reaction.                                  STATUS:  NOT MET  4. Carrying, Moving, and Handling Objects Functional Limitation                                   LTG 4: 12 weeks:  The patient will demonstrate 1-19% limitation by achieving a score of 15 on the Quick DASH.                                  STATUS:  NOT MET                  STG 4a: 4 weeks:  The patient will demonstrate 40-59% limitation by achieving a score of 35 on the Quick DASH.                                    STATUS:  NOT MET      Recommendations: Continue as planned  Timeframe: 2 months  Prognosis to achieve goals: good      OT SIGNATURE: WAYLON Hoyos, OTR/L, CHT     Electronically signed    [place] LICENSE: [id number]     Timed:  Manual Therapy:    0     mins  24059;  Therapeutic Exercise:    10     mins  17317;  Therapeutic Activity:    10     mins  85679;     Neuromuscular Lei:    10    mins  14530;    Ultrasound:     0     mins  08269;    Electrical Stimulation:    0     mins  75092;    Untimed:  Electrical Stimulation:    0     mins  12980 ( );  Fluidotherapy:        0    mins  86935  Paraffin:                          0    mins  99980    Timed Treatment:   30   mins   Total Treatment:     30   mins

## 2022-01-25 ENCOUNTER — TELEPHONE (OUTPATIENT)
Dept: PHYSICAL THERAPY | Facility: CLINIC | Age: 52
End: 2022-01-25

## 2022-02-08 ENCOUNTER — TREATMENT (OUTPATIENT)
Dept: PHYSICAL THERAPY | Facility: CLINIC | Age: 52
End: 2022-02-08

## 2022-02-08 DIAGNOSIS — M25.631 STIFFNESS OF RIGHT WRIST JOINT: ICD-10-CM

## 2022-02-08 DIAGNOSIS — M25.531 RIGHT WRIST PAIN: Primary | ICD-10-CM

## 2022-02-08 DIAGNOSIS — Z98.890 STATUS POST PROXIMAL ROW CARPECTOMY OF WRIST: ICD-10-CM

## 2022-02-08 PROCEDURE — 97530 THERAPEUTIC ACTIVITIES: CPT | Performed by: OCCUPATIONAL THERAPIST

## 2022-02-08 PROCEDURE — 97110 THERAPEUTIC EXERCISES: CPT | Performed by: OCCUPATIONAL THERAPIST

## 2022-02-08 NOTE — PROGRESS NOTES
Occupational Therapy Daily Treatment Note      Patient: Naima Perkins   : 1970  Referring practitioner: Rigoberto Taylor MD  Date of Initial Visit: Type: THERAPY  Noted: 2021  Today's Date: 2022  Patient seen for 5 sessions  No diagnosis found.       Naima Perkins reports I am able to chop onions and  my grandchild now.  Trying to balance using it and over doing it.   Wrist pain /10       Objective   See Exercise, Manual, and Modality Logs for complete treatment.   Wrist ext 45  Wrist flexion 50  RD 5  UD 10  Pro 80  Sup 80       20#    Assessment/Plan  Pt reports she has been able to journal again with R Hand.       Cont per POC           Timed:  Manual Therapy:    0     mins  66428;  Therapeutic Exercise:    20     mins  03060;  Therapeutic Activity:    10     mins  03930;     Neuromuscular Lei:    0    mins  03456;    Ultrasound:     0     mins  32888;    Electrical Stimulation:    0     mins  72299;    Untimed:  Electrical Stimulation:    0     mins  14850 ( );  Fluidotherapy:        0    mins  83069  Paraffin:                          0    mins  77435    Timed Treatment:   30   mins   Total Treatment:     30   mins    OT SIGNATURE: WAYLON Hoyos, OTR/L, CHT     Electronically signed    [place] LICENSE: [id number]

## 2022-02-10 ENCOUNTER — TREATMENT (OUTPATIENT)
Dept: PHYSICAL THERAPY | Facility: CLINIC | Age: 52
End: 2022-02-10

## 2022-02-10 DIAGNOSIS — Z98.890 STATUS POST PROXIMAL ROW CARPECTOMY OF WRIST: ICD-10-CM

## 2022-02-10 DIAGNOSIS — M25.631 STIFFNESS OF RIGHT WRIST JOINT: ICD-10-CM

## 2022-02-10 DIAGNOSIS — M25.531 RIGHT WRIST PAIN: Primary | ICD-10-CM

## 2022-02-10 PROCEDURE — 97110 THERAPEUTIC EXERCISES: CPT | Performed by: OCCUPATIONAL THERAPIST

## 2022-02-10 PROCEDURE — 97530 THERAPEUTIC ACTIVITIES: CPT | Performed by: OCCUPATIONAL THERAPIST

## 2022-02-10 PROCEDURE — 97022 WHIRLPOOL THERAPY: CPT | Performed by: OCCUPATIONAL THERAPIST

## 2022-02-10 PROCEDURE — 97112 NEUROMUSCULAR REEDUCATION: CPT | Performed by: OCCUPATIONAL THERAPIST

## 2022-02-10 NOTE — PROGRESS NOTES
Occupational Therapy Daily Treatment Note      Patient: Naima Perkins   : 1970  Referring practitioner: Rigoberto Taylor MD  Date of Initial Visit: Type: THERAPY  Noted: 2021  Today's Date: 2/10/2022  Patient seen for 6 sessions    ICD-10-CM ICD-9-CM   1. Right wrist pain  M25.531 719.43   2. Stiffness of right wrist joint  M25.631 719.53   3. Status post proximal row carpectomy of wrist  Z98.890 V45.89          Naima Perkins reports I have had a few twinges of pain today, but I have used it a lot.       Objective   See Exercise, Manual, and Modality Logs for complete treatment.   Pt progressing with strengthening and functional use of  L UE       Assessment/Plan  Pt reports she is fatiguing with writing tasks, needs to be able to write for an hour at a time and take notes from phone calls.      Cont per POC           Timed:  Manual Therapy:    0     mins  22430;  Therapeutic Exercise:    10     mins  78703;  Therapeutic Activity:    10     mins  99104;     Neuromuscular Lei:    10    mins  07192;    Ultrasound:     0     mins  79819;    Electrical Stimulation:    0     mins  83305;    Untimed:  Electrical Stimulation:    0     mins  15467 ( );  Fluidotherapy:        10    mins  58463  Paraffin:                          0    mins  81682    Timed Treatment:   30   mins   Total Treatment:     40   mins    OT SIGNATURE: WAYLON Hoyos, OTR/L, CHT     Electronically signed    [place] LICENSE: [id number]

## 2022-02-15 ENCOUNTER — TREATMENT (OUTPATIENT)
Dept: PHYSICAL THERAPY | Facility: CLINIC | Age: 52
End: 2022-02-15

## 2022-02-15 DIAGNOSIS — Z98.890 STATUS POST PROXIMAL ROW CARPECTOMY OF WRIST: ICD-10-CM

## 2022-02-15 DIAGNOSIS — M25.531 RIGHT WRIST PAIN: Primary | ICD-10-CM

## 2022-02-15 DIAGNOSIS — M25.631 STIFFNESS OF RIGHT WRIST JOINT: ICD-10-CM

## 2022-02-15 PROCEDURE — 97110 THERAPEUTIC EXERCISES: CPT | Performed by: OCCUPATIONAL THERAPIST

## 2022-02-15 PROCEDURE — 97140 MANUAL THERAPY 1/> REGIONS: CPT | Performed by: OCCUPATIONAL THERAPIST

## 2022-02-15 PROCEDURE — 97022 WHIRLPOOL THERAPY: CPT | Performed by: OCCUPATIONAL THERAPIST

## 2022-02-15 PROCEDURE — 97530 THERAPEUTIC ACTIVITIES: CPT | Performed by: OCCUPATIONAL THERAPIST

## 2022-02-15 NOTE — PROGRESS NOTES
Occupational Therapy Daily Treatment Note      Patient: Naima Perkins   : 1970  Referring practitioner: Rigoberto Taylor MD  Date of Initial Visit: Type: THERAPY  Noted: 2021  Today's Date: 2/15/2022  Patient seen for 7 sessions    ICD-10-CM ICD-9-CM   1. Right wrist pain  M25.531 719.43   2. Stiffness of right wrist joint  M25.631 719.53   3. Status post proximal row carpectomy of wrist  Z98.890 V45.89          Naima Perkins reports I did a lot of fine detail work and cooking yesterday, so I am definitely more sore today 2/10.      Objective   See Exercise, Manual, and Modality Logs for complete treatment.   Pt has been coloring to progress tolerance to holding pen for writing for about 30 minutes.     Assessment/Plan  Pt is progressing with strengthening and functional use of R UE.       Cont per POC           Timed:  Manual Therapy:    10     mins  60182;  Therapeutic Exercise:    10     mins  39336;  Therapeutic Activity:    10     mins  75753;     Neuromuscular Lei:    0    mins  21894;    Ultrasound:     0     mins  96596;    Electrical Stimulation:    0     mins  74327;    Untimed:  Electrical Stimulation:    0     mins  69422 ( );  Fluidotherapy:       1 0    mins  08566  Paraffin:                          0    mins  14369    Timed Treatment:   30   mins   Total Treatment:     40   mins    OT SIGNATURE: WAYLON Hoyos, OTR/L, CHT     Electronically signed    [place] LICENSE: [id number]

## 2022-02-17 ENCOUNTER — TREATMENT (OUTPATIENT)
Dept: PHYSICAL THERAPY | Facility: CLINIC | Age: 52
End: 2022-02-17

## 2022-02-17 DIAGNOSIS — M25.531 RIGHT WRIST PAIN: Primary | ICD-10-CM

## 2022-02-17 DIAGNOSIS — M25.631 STIFFNESS OF RIGHT WRIST JOINT: ICD-10-CM

## 2022-02-17 DIAGNOSIS — Z98.890 STATUS POST PROXIMAL ROW CARPECTOMY OF WRIST: ICD-10-CM

## 2022-02-17 PROCEDURE — 97530 THERAPEUTIC ACTIVITIES: CPT | Performed by: OCCUPATIONAL THERAPIST

## 2022-02-17 PROCEDURE — 97110 THERAPEUTIC EXERCISES: CPT | Performed by: OCCUPATIONAL THERAPIST

## 2022-02-17 PROCEDURE — 97022 WHIRLPOOL THERAPY: CPT | Performed by: OCCUPATIONAL THERAPIST

## 2022-02-17 PROCEDURE — 97112 NEUROMUSCULAR REEDUCATION: CPT | Performed by: OCCUPATIONAL THERAPIST

## 2022-02-17 NOTE — PROGRESS NOTES
Occupational Therapy Daily Treatment Note      Patient: Naima Perkins   : 1970  Referring practitioner: Rigoberto Taylor MD  Date of Initial Visit: Type: THERAPY  Noted: 2021  Today's Date: 2022  Patient seen for 8 sessions    ICD-10-CM ICD-9-CM   1. Right wrist pain  M25.531 719.43   2. Stiffness of right wrist joint  M25.631 719.53   3. Status post proximal row carpectomy of wrist  Z98.890 V45.89          Naima Perkins reports I am sore today between doing more and having my granddaughter makes me sore.       Objective   See Exercise, Manual, and Modality Logs for complete treatment.   AROM of R wrist is improving.  Doubled sets today with 3# without pain, pt reported it felt better after getting moving and using it.     Assessment/Plan  Pt has increased AROM and endurance with  strength and functional use.       Cont per POC           Timed:  Manual Therapy:    0     mins  40630;  Therapeutic Exercise:    10     mins  02420;  Therapeutic Activity:    10     mins  09911;     Neuromuscular Lei:    10    mins  88744;    Ultrasound:     0     mins  22777;    Electrical Stimulation:    0     mins  07971;    Untimed:  Electrical Stimulation:    0     mins  36571 ( );  Fluidotherapy:        10    mins  91281  Paraffin:                          0    mins  76197    Timed Treatment:   30   mins   Total Treatment:     40   mins    OT SIGNATURE: WAYLON Hoyos, OTR/L, CHT     Electronically signed    [place] LICENSE: [id number]     
Abdomen soft, non-tender, no guarding.

## 2022-02-22 ENCOUNTER — TREATMENT (OUTPATIENT)
Dept: PHYSICAL THERAPY | Facility: CLINIC | Age: 52
End: 2022-02-22

## 2022-02-22 DIAGNOSIS — M25.531 RIGHT WRIST PAIN: Primary | ICD-10-CM

## 2022-02-22 DIAGNOSIS — Z98.890 STATUS POST PROXIMAL ROW CARPECTOMY OF WRIST: ICD-10-CM

## 2022-02-22 DIAGNOSIS — M25.631 STIFFNESS OF RIGHT WRIST JOINT: ICD-10-CM

## 2022-02-22 PROCEDURE — 97022 WHIRLPOOL THERAPY: CPT | Performed by: OCCUPATIONAL THERAPIST

## 2022-02-22 PROCEDURE — 97530 THERAPEUTIC ACTIVITIES: CPT | Performed by: OCCUPATIONAL THERAPIST

## 2022-02-22 PROCEDURE — 97110 THERAPEUTIC EXERCISES: CPT | Performed by: OCCUPATIONAL THERAPIST

## 2022-02-22 PROCEDURE — 97112 NEUROMUSCULAR REEDUCATION: CPT | Performed by: OCCUPATIONAL THERAPIST

## 2022-02-22 NOTE — PROGRESS NOTES
Re-Assessment / Re-Certification      Patient: Naima Perkins   : 1970  Diagnosis/ICD-10 Code:  Right wrist pain [M25.531]  Referring practitioner: Rigoberto Taylor MD  Date of Initial Visit: Type: THERAPY  Noted: 2021  Today's Date: 2022  Patient seen for 9 sessions      Subjective:   Naima Perkins reports: Over the weekend I had a lot of pain and stiffness.  It felt like the rubber band was back.  I feel better today.  Ulnar sided wrist pain with movement 4-510  Subjective Questionnaire: QuickDASH: 30  Clinical Progress: improved  Home Program Compliance: Yes  Treatment has included: therapeutic exercise, neuromuscular re-education, manual therapy and therapeutic activity    Subjective   Objective   Wrist ext 50  Wrist flex 50  Pro 70  Sup 90    20#    Assessment/Plan    Visit Diagnoses:    ICD-10-CM ICD-9-CM   1. Right wrist pain  M25.531 719.43   2. Stiffness of right wrist joint  M25.631 719.53   3. Status post proximal row carpectomy of wrist  Z98.890 V45.89       Progress toward previous goals: Partially Met    Plan Goals: 1. The patient complains of pain in the R wrist                  LTG 1: 12 weeks:  The patient will report a pain rating of 0/10 or better in order to improve sleep quality and tolerance to performance of activities of daily living.                                  STATUS:  NOT MET                  STG 1a: 4weeks:  The patient will report a pain rating of 6/10 or better.                                   STATUS:  MET  2. The patient has limited ROM of the R wrist                  LTG 2: 12 weeks:  The patient will demonstrate 100 degrees of KAMINSKI of R wrist to allow the patient to weight bear and write.                                  STATUS:  MET                  STG 2a: 4 weeks:  The patient will demonstrate 75 degrees of KAMINSKI.                                  STATUS:  MET                         3. The patient has limited strength of the R  UE.                  LTG 3: 12 weeks:  The patient will demonstrate 30# in order to return to functional lifting and opening jars.                                  STATUS:  NOT MET                  STG 3a: 8 weeks:  The patient will demonstrate tolerance of light strengthening without adverse reaction.                                  STATUS:  MET  4. Carrying, Moving, and Handling Objects Functional Limitation                                   LTG 4: 12 weeks:  The patient will demonstrate 1-19% limitation by achieving a score of 15 on the Quick DASH.                                  STATUS:  NOT MET                  STG 4a: 4 weeks:  The patient will demonstrate 40-59% limitation by achieving a score of 35 on the Quick DASH.                                    STATUS:  NOT MET      Recommendations: Continue as planned  Timeframe: 1 month  Prognosis to achieve goals: good      OT SIGNATURE: Laura Garcia, OTD, OTR/L, CHT     Electronically signed    [place] LICENSE: [id number]   DATE TREATMENT INITIATED: 2/22/2022      90 Day Recertification  Certification Period: 2/22/2022 thru 5/22/2022  I certify that the therapy services are furnished while this patient is under my care.  The services outlined above are required by this patient, and will be reviewed every 90 days.      Based upon review of the patient's progress and continued therapy plan, it is my medical opinion that Naima Perkins should continue occupational therapy treatment at [facility] PH[facility]  [address]  [place] [address] [phone].    Signature: __________________________________  PHYSICIAN: Rigoberto Taylor MD  NPI: [id number]                                      DATE:      Timed:  Manual Therapy:    0     mins  51099;  Therapeutic Exercise:    10     mins  50974;  Therapeutic Activity:    10     mins  16360;     Neuromuscular Lei:    10    mins  25161;    Ultrasound:     0     mins  50336;    Electrical  Stimulation:    0     mins  78382;    Untimed:  Electrical Stimulation:    0     mins  23754 ( );  Fluidotherapy:        10    mins  89367  Paraffin:                          0    mins  63134    Timed Treatment:   30   mins   Total Treatment:     40   mins

## 2022-02-24 ENCOUNTER — TREATMENT (OUTPATIENT)
Dept: PHYSICAL THERAPY | Facility: CLINIC | Age: 52
End: 2022-02-24

## 2022-02-24 DIAGNOSIS — Z98.890 STATUS POST PROXIMAL ROW CARPECTOMY OF WRIST: ICD-10-CM

## 2022-02-24 DIAGNOSIS — M25.531 RIGHT WRIST PAIN: Primary | ICD-10-CM

## 2022-02-24 DIAGNOSIS — M25.631 STIFFNESS OF RIGHT WRIST JOINT: ICD-10-CM

## 2022-02-24 PROCEDURE — 97112 NEUROMUSCULAR REEDUCATION: CPT | Performed by: OCCUPATIONAL THERAPIST

## 2022-02-24 PROCEDURE — 97530 THERAPEUTIC ACTIVITIES: CPT | Performed by: OCCUPATIONAL THERAPIST

## 2022-02-24 PROCEDURE — 97110 THERAPEUTIC EXERCISES: CPT | Performed by: OCCUPATIONAL THERAPIST

## 2022-02-24 NOTE — PROGRESS NOTES
Occupational Therapy Daily Treatment Note      Patient: Naima Perkins   : 1970  Referring practitioner: Rigoberto Taylor MD  Date of Initial Visit: Type: THERAPY  Noted: 2021  Today's Date: 2022  Patient seen for 10 sessions    ICD-10-CM ICD-9-CM   1. Right wrist pain  M25.531 719.43   2. Stiffness of right wrist joint  M25.631 719.53   3. Status post proximal row carpectomy of wrist  Z98.890 V45.89          Naima Perkins reports I am feeling much better 1/10.        Objective   See Exercise, Manual, and Modality Logs for complete treatment.   Pt is progressing with strengthening and functional use of R UE.     Assessment/Plan  Increased resistance with wrist 3 planes without adverse reaction.      Cont per POC           Timed:  Manual Therapy:    0     mins  53643;  Therapeutic Exercise:    10     mins  26784;  Therapeutic Activity:    10     mins  26835;     Neuromuscular Lei:    10    mins  73824;    Ultrasound:     0     mins  67239;    Electrical Stimulation:    0     mins  83583;    Untimed:  Electrical Stimulation:    0     mins  04259 ( );  Fluidotherapy:        00    mins  33452  Paraffin:                          0    mins  53276    Timed Treatment:   30   mins   Total Treatment:     30   mins    OT SIGNATURE: WAYLON Hoyos, OTR/L, CHT     Electronically signed    [place] LICENSE: [id number]

## 2022-02-28 ENCOUNTER — TREATMENT (OUTPATIENT)
Dept: PHYSICAL THERAPY | Facility: CLINIC | Age: 52
End: 2022-02-28

## 2022-02-28 DIAGNOSIS — Z98.890 STATUS POST PROXIMAL ROW CARPECTOMY OF WRIST: ICD-10-CM

## 2022-02-28 DIAGNOSIS — M25.531 RIGHT WRIST PAIN: Primary | ICD-10-CM

## 2022-02-28 DIAGNOSIS — M25.631 STIFFNESS OF RIGHT WRIST JOINT: ICD-10-CM

## 2022-02-28 PROCEDURE — 97530 THERAPEUTIC ACTIVITIES: CPT | Performed by: OCCUPATIONAL THERAPIST

## 2022-02-28 PROCEDURE — 97112 NEUROMUSCULAR REEDUCATION: CPT | Performed by: OCCUPATIONAL THERAPIST

## 2022-02-28 PROCEDURE — 97022 WHIRLPOOL THERAPY: CPT | Performed by: OCCUPATIONAL THERAPIST

## 2022-02-28 PROCEDURE — 97110 THERAPEUTIC EXERCISES: CPT | Performed by: OCCUPATIONAL THERAPIST

## 2022-02-28 NOTE — PROGRESS NOTES
Occupational Therapy Daily Treatment Note      Patient: Naima Perkins   : 1970  Referring practitioner: Rigoberto Taylor MD  Date of Initial Visit: Type: THERAPY  Noted: 2021  Today's Date: 2022  Patient seen for 11 sessions    ICD-10-CM ICD-9-CM   1. Right wrist pain  M25.531 719.43   2. Stiffness of right wrist joint  M25.631 719.53   3. Status post proximal row carpectomy of wrist  Z98.890 V45.89          Naima Perkins reports I am feeling really good today.      Objective   See Exercise, Manual, and Modality Logs for complete treatment.   Pt progressing with strengthening and tolerance for functional lifting and gripping.     Assessment/Plan  Pt progressing with tolerance for strengthening       Cont per POC           Timed:  Manual Therapy:    0     mins  36228;  Therapeutic Exercise:    10     mins  15662;  Therapeutic Activity:    10     mins  99436;     Neuromuscular Lei:    10    mins  04249;    Ultrasound:     0     mins  45136;    Electrical Stimulation:    0     mins  46683;    Untimed:  Electrical Stimulation:    0     mins  70062 ( );  Fluidotherapy:        0    mins  73129  Paraffin:                          0    mins  92599    Timed Treatment:   30   mins   Total Treatment:     30   mins    OT SIGNATURE: WALYON Hoyos, OTR/L, CHT     Electronically signed    [place] LICENSE: [id number]

## 2022-03-02 ENCOUNTER — TREATMENT (OUTPATIENT)
Dept: PHYSICAL THERAPY | Facility: CLINIC | Age: 52
End: 2022-03-02

## 2022-03-02 DIAGNOSIS — M25.531 RIGHT WRIST PAIN: Primary | ICD-10-CM

## 2022-03-02 DIAGNOSIS — M25.631 STIFFNESS OF RIGHT WRIST JOINT: ICD-10-CM

## 2022-03-02 DIAGNOSIS — Z98.890 STATUS POST PROXIMAL ROW CARPECTOMY OF WRIST: ICD-10-CM

## 2022-03-02 PROCEDURE — 97140 MANUAL THERAPY 1/> REGIONS: CPT | Performed by: OCCUPATIONAL THERAPIST

## 2022-03-02 PROCEDURE — 97022 WHIRLPOOL THERAPY: CPT | Performed by: OCCUPATIONAL THERAPIST

## 2022-03-02 PROCEDURE — 97110 THERAPEUTIC EXERCISES: CPT | Performed by: OCCUPATIONAL THERAPIST

## 2022-03-02 PROCEDURE — 97112 NEUROMUSCULAR REEDUCATION: CPT | Performed by: OCCUPATIONAL THERAPIST

## 2022-03-02 NOTE — PROGRESS NOTES
Occupational Therapy Daily Treatment Note      Patient: Naima Perkins   : 1970  Referring practitioner: Rigoberto Taylor MD  Date of Initial Visit: Type: THERAPY  Noted: 2021  Today's Date: 3/2/2022  Patient seen for 12 sessions    ICD-10-CM ICD-9-CM   1. Right wrist pain  M25.531 719.43   2. Stiffness of right wrist joint  M25.631 719.53   3. Status post proximal row carpectomy of wrist  Z98.890 V45.89          Naima Perkins reports my IF and wrist are sore.      Objective   See Exercise, Manual, and Modality Logs for complete treatment.   AROM is improving, strength is improving.    Assessment/Plan  Pt progressing with tolerance for WB and functional .      Cont per POC           Timed:  Manual Therapy:    10     mins  89207;  Therapeutic Exercise:    10     mins  67525;  Therapeutic Activity:    0     mins  67198;     Neuromuscular Lei:    10    mins  41111;    Ultrasound:     0     mins  52722;    Electrical Stimulation:    0     mins  98037;    Untimed:  Electrical Stimulation:    0     mins  60962 ( );  Fluidotherapy:        10    mins  78785  Paraffin:                          0    mins  59929    Timed Treatment:   30   mins   Total Treatment:     40   mins    OT SIGNATURE: WAYLON Hoyos, OTR/L, CHT     Electronically signed    [place] LICENSE: [id number]

## 2022-03-08 ENCOUNTER — TREATMENT (OUTPATIENT)
Dept: PHYSICAL THERAPY | Facility: CLINIC | Age: 52
End: 2022-03-08

## 2022-03-08 DIAGNOSIS — M25.631 STIFFNESS OF RIGHT WRIST JOINT: ICD-10-CM

## 2022-03-08 DIAGNOSIS — Z98.890 STATUS POST PROXIMAL ROW CARPECTOMY OF WRIST: ICD-10-CM

## 2022-03-08 DIAGNOSIS — M25.531 RIGHT WRIST PAIN: Primary | ICD-10-CM

## 2022-03-08 PROCEDURE — 97022 WHIRLPOOL THERAPY: CPT | Performed by: OCCUPATIONAL THERAPIST

## 2022-03-08 PROCEDURE — 97112 NEUROMUSCULAR REEDUCATION: CPT | Performed by: OCCUPATIONAL THERAPIST

## 2022-03-08 PROCEDURE — 97530 THERAPEUTIC ACTIVITIES: CPT | Performed by: OCCUPATIONAL THERAPIST

## 2022-03-08 PROCEDURE — 97110 THERAPEUTIC EXERCISES: CPT | Performed by: OCCUPATIONAL THERAPIST

## 2022-03-08 NOTE — PROGRESS NOTES
"Occupational Therapy Daily Treatment Note      Patient: Naima Perkins   : 1970  Referring practitioner: Rigoberto Taylor MD  Date of Initial Visit: Type: THERAPY  Noted: 2021  Today's Date: 3/8/2022  Patient seen for 13 sessions    ICD-10-CM ICD-9-CM   1. Right wrist pain  M25.531 719.43   2. Stiffness of right wrist joint  M25.631 719.53   3. Status post proximal row carpectomy of wrist  Z98.890 V45.89          Naima Perkins reports my wrist was \"fussy\" yesterday.  Still working on making her wrist do what she needs it to.       Objective   See Exercise, Manual, and Modality Logs for complete treatment.   Lifting 5# improved tolerance for strength.      Assessment/Plan  Pt is tolerating increased resistance with R UE for functional tasks.       Cont per POC           Timed:  Manual Therapy:    0     mins  40029;  Therapeutic Exercise:    10     mins  19609;  Therapeutic Activity:    10     mins  08380;     Neuromuscular Lei:    10    mins  22009;    Ultrasound:     0     mins  14472;    Electrical Stimulation:    0     mins  22720;    Untimed:  Electrical Stimulation:    0     mins  34322 ( );  Fluidotherapy:        10    mins  98961  Paraffin:                          0    mins  78713    Timed Treatment:   30   mins   Total Treatment:     40   mins    OT SIGNATURE: WAYLON Hoyos, OTR/L, CHT     Electronically signed    [place] LICENSE: [id number]     "

## 2022-03-10 ENCOUNTER — TREATMENT (OUTPATIENT)
Dept: PHYSICAL THERAPY | Facility: CLINIC | Age: 52
End: 2022-03-10

## 2022-03-10 DIAGNOSIS — M25.531 RIGHT WRIST PAIN: Primary | ICD-10-CM

## 2022-03-10 DIAGNOSIS — M25.631 STIFFNESS OF RIGHT WRIST JOINT: ICD-10-CM

## 2022-03-10 DIAGNOSIS — Z98.890 STATUS POST PROXIMAL ROW CARPECTOMY OF WRIST: ICD-10-CM

## 2022-03-10 PROCEDURE — 97530 THERAPEUTIC ACTIVITIES: CPT | Performed by: OCCUPATIONAL THERAPIST

## 2022-03-10 PROCEDURE — 97110 THERAPEUTIC EXERCISES: CPT | Performed by: OCCUPATIONAL THERAPIST

## 2022-03-10 PROCEDURE — 97112 NEUROMUSCULAR REEDUCATION: CPT | Performed by: OCCUPATIONAL THERAPIST

## 2022-03-10 NOTE — PROGRESS NOTES
Occupational Therapy Daily Treatment Note      Patient: Naima Perkins   : 1970  Referring practitioner: Rigoberto Taylor MD  Date of Initial Visit: Type: THERAPY  Noted: 2021  Today's Date: 3/10/2022  Patient seen for 14 sessions    ICD-10-CM ICD-9-CM   1. Right wrist pain  M25.531 719.43   2. Stiffness of right wrist joint  M25.631 719.53   3. Status post proximal row carpectomy of wrist  Z98.890 V45.89          Naima Perkins reports I am dong much better with it.  Used it to clean yesterday.      Objective   See Exercise, Manual, and Modality Logs for complete treatment.   Ext 60  Flex 50  UD 25  RD 15       strength #40    Assessment/Plan    Progress program with strengthening working towards transition to HEP.  Reduce POC to 1x/wk.             Timed:  Manual Therapy:    5     mins  91030;  Therapeutic Exercise:    10     mins  52522;  Therapeutic Activity:    10     mins  91021;     Neuromuscular Lei:    10    mins  43771;    Ultrasound:     0     mins  69727;    Electrical Stimulation:    0     mins  06340;    Untimed:  Electrical Stimulation:    0     mins  24059 ( );  Fluidotherapy:        0    mins  36296  Paraffin:                          0    mins  33402    Timed Treatment:   35   mins   Total Treatment:     35   mins    OT SIGNATURE: WAYLON Hoyos, OTR/L, CHT     Electronically signed    [place] LICENSE: [id number]

## 2022-03-16 ENCOUNTER — TREATMENT (OUTPATIENT)
Dept: PHYSICAL THERAPY | Facility: CLINIC | Age: 52
End: 2022-03-16

## 2022-03-16 DIAGNOSIS — M25.631 STIFFNESS OF RIGHT WRIST JOINT: ICD-10-CM

## 2022-03-16 DIAGNOSIS — Z98.890 STATUS POST PROXIMAL ROW CARPECTOMY OF WRIST: ICD-10-CM

## 2022-03-16 DIAGNOSIS — M25.531 RIGHT WRIST PAIN: Primary | ICD-10-CM

## 2022-03-16 PROCEDURE — 97022 WHIRLPOOL THERAPY: CPT | Performed by: OCCUPATIONAL THERAPIST

## 2022-03-16 PROCEDURE — 97530 THERAPEUTIC ACTIVITIES: CPT | Performed by: OCCUPATIONAL THERAPIST

## 2022-03-16 PROCEDURE — 97112 NEUROMUSCULAR REEDUCATION: CPT | Performed by: OCCUPATIONAL THERAPIST

## 2022-03-16 PROCEDURE — 97110 THERAPEUTIC EXERCISES: CPT | Performed by: OCCUPATIONAL THERAPIST

## 2022-03-16 NOTE — PROGRESS NOTES
Occupational Therapy Daily Treatment Note      Patient: Naima Perkins   : 1970  Referring practitioner: Rigoberto Taylor MD  Date of Initial Visit: Type: THERAPY  Noted: 2021  Today's Date: 3/16/2022  Patient seen for 15 sessions    ICD-10-CM ICD-9-CM   1. Right wrist pain  M25.531 719.43   2. Stiffness of right wrist joint  M25.631 719.53   3. Status post proximal row carpectomy of wrist  Z98.890 V45.89          Naima Perkins reports I've been having intermittent ulnar side of wrist pain and median nerve.  Pt     Objective   See Exercise, Manual, and Modality Logs for complete treatment.       Assessment/Plan  Pt progressing with strengthening and functional use of R UE.       Cont per POC           Timed:  Manual Therapy:    0     mins  61172;  Therapeutic Exercise:    10     mins  37154;  Therapeutic Activity:    10     mins  39490;     Neuromuscular Lei:    10    mins  78916;    Ultrasound:     0     mins  61743;    Electrical Stimulation:    0     mins  09025;    Untimed:  Electrical Stimulation:    0     mins  18354 ( );  Fluidotherapy:        10    mins  38932  Paraffin:                          0    mins  62244    Timed Treatment:   30   mins   Total Treatment:     40   mins    OT SIGNATURE: WAYLON Hoyos, OTR/L, CHT     Electronically signed    [place] LICENSE: [id number]

## 2022-03-21 ENCOUNTER — TREATMENT (OUTPATIENT)
Dept: PHYSICAL THERAPY | Facility: CLINIC | Age: 52
End: 2022-03-21

## 2022-03-21 DIAGNOSIS — M25.631 STIFFNESS OF RIGHT WRIST JOINT: ICD-10-CM

## 2022-03-21 DIAGNOSIS — Z98.890 STATUS POST PROXIMAL ROW CARPECTOMY OF WRIST: ICD-10-CM

## 2022-03-21 DIAGNOSIS — M25.531 RIGHT WRIST PAIN: Primary | ICD-10-CM

## 2022-03-21 PROCEDURE — 97530 THERAPEUTIC ACTIVITIES: CPT | Performed by: OCCUPATIONAL THERAPIST

## 2022-03-21 PROCEDURE — 97110 THERAPEUTIC EXERCISES: CPT | Performed by: OCCUPATIONAL THERAPIST

## 2022-03-21 PROCEDURE — 97112 NEUROMUSCULAR REEDUCATION: CPT | Performed by: OCCUPATIONAL THERAPIST

## 2022-03-21 PROCEDURE — 97022 WHIRLPOOL THERAPY: CPT | Performed by: OCCUPATIONAL THERAPIST

## 2022-03-21 NOTE — PROGRESS NOTES
Occupational Therapy Daily Treatment Note      Patient: Naima Perkins   : 1970  Referring practitioner: Rigoberto Taylor MD  Date of Initial Visit: No linked episodes  Today's Date: 3/21/2022  Patient seen for 16 sessions    ICD-10-CM ICD-9-CM   1. Right wrist pain  M25.531 719.43   2. Stiffness of right wrist joint  M25.631 719.53   3. Status post proximal row carpectomy of wrist  Z98.890 V45.89          Naima Perkins reports I was a little sore yesterday, but fine today.  I did a lot this weekend, getting things done.     Objective   See Exercise, Manual, and Modality Logs for complete treatment.   Ext 60  Flex 55  UD 25  RD 15         strength #40    Quick DASH 21    Assessment/Plan  Plan Goals: 1. The patient complains of pain in the R wrist                  LTG 1: 12 weeks:  The patient will report a pain rating of 0/10 or better in order to improve sleep quality and tolerance to performance of activities of daily living.                                  STATUS: MET                  STG 1a: 4weeks:  The patient will report a pain rating of 6/10 or better.                                   STATUS:  MET  2. The patient has limited ROM of the R wrist                  LTG 2: 12 weeks:  The patient will demonstrate 100 degrees of KAMINSKI of R wrist to allow the patient to weight bear and write.                                  STATUS:  MET                  STG 2a: 4 weeks:  The patient will demonstrate 75 degrees of KAMINSKI.                                  STATUS:  MET                         3. The patient has limited strength of the R UE.                  LTG 3: 12 weeks:  The patient will demonstrate 30# in order to return to functional lifting and opening jars.                                  STATUS:   MET                  STG 3a: 8 weeks:  The patient will demonstrate tolerance of light strengthening without adverse reaction.                                  STATUS:  MET  4. Carrying, Moving,  and Handling Objects Functional Limitation                                   LTG 4: 12 weeks:  The patient will demonstrate 1-19% limitation by achieving a score of 15 on the Quick DASH.                                  STATUS:  NOT MET                  STG 4a: 4 weeks:  The patient will demonstrate 40-59% limitation by achieving a score of 35 on the Quick DASH.                                    STATUS: MET      D/C to HEP           Timed:  Manual Therapy:    0     mins  06932;  Therapeutic Exercise:    10     mins  66137;  Therapeutic Activity:    10     mins  97606;     Neuromuscular Lei:    10    mins  31370;    Ultrasound:     0     mins  41787;    Electrical Stimulation:    0     mins  09459;    Untimed:  Electrical Stimulation:    0     mins  94950 ( );  Fluidotherapy:        10    mins  12031  Paraffin:                          0    mins  01921    Timed Treatment:   30   mins   Total Treatment:     40   mins    OT SIGNATURE: WAYLON Hoyos, OTR/L, CHT     Electronically signed    [place] LICENSE: [id number]

## 2022-03-27 ENCOUNTER — APPOINTMENT (OUTPATIENT)
Dept: GENERAL RADIOLOGY | Facility: HOSPITAL | Age: 52
End: 2022-03-27

## 2022-03-27 ENCOUNTER — HOSPITAL ENCOUNTER (EMERGENCY)
Facility: HOSPITAL | Age: 52
Discharge: HOME | End: 2022-03-27
Attending: EMERGENCY MEDICINE | Admitting: EMERGENCY MEDICINE

## 2022-03-27 ENCOUNTER — APPOINTMENT (OUTPATIENT)
Dept: CT IMAGING | Facility: HOSPITAL | Age: 52
End: 2022-03-27

## 2022-03-27 VITALS
SYSTOLIC BLOOD PRESSURE: 99 MMHG | WEIGHT: 158.07 LBS | HEART RATE: 61 BPM | HEIGHT: 65 IN | TEMPERATURE: 98.1 F | OXYGEN SATURATION: 99 % | BODY MASS INDEX: 26.34 KG/M2 | DIASTOLIC BLOOD PRESSURE: 57 MMHG | RESPIRATION RATE: 18 BRPM

## 2022-03-27 DIAGNOSIS — S63.501A SPRAIN OF RIGHT WRIST, INITIAL ENCOUNTER: Primary | ICD-10-CM
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
## 2022-03-27 DIAGNOSIS — S09.90XA INJURY OF HEAD, INITIAL ENCOUNTER: ICD-10-CM

## 2022-03-27 DIAGNOSIS — M54.2 NECK PAIN: ICD-10-CM

## 2022-03-27 PROCEDURE — 72050 X-RAY EXAM NECK SPINE 4/5VWS: CPT

## 2022-03-27 PROCEDURE — 73110 X-RAY EXAM OF WRIST: CPT

## 2022-03-27 PROCEDURE — 70450 CT HEAD/BRAIN W/O DYE: CPT

## 2022-03-27 PROCEDURE — 99283 EMERGENCY DEPT VISIT LOW MDM: CPT

## 2022-03-27 RX ORDER — OXYCODONE HYDROCHLORIDE AND ACETAMINOPHEN 5; 325 MG/1; MG/1
1 TABLET ORAL ONCE
Status: COMPLETED | OUTPATIENT
Start: 2022-03-27 | End: 2022-03-27

## 2022-03-27 RX ORDER — SODIUM CHLORIDE 0.9 % (FLUSH) 0.9 %
10 SYRINGE (ML) INJECTION AS NEEDED
Status: DISCONTINUED | OUTPATIENT
Start: 2022-03-27 | End: 2022-03-27 | Stop reason: HOSPADM

## 2022-03-27 RX ORDER — SACCHAROMYCES BOULARDII 250 MG
250 CAPSULE ORAL 2 TIMES DAILY
COMMUNITY

## 2022-03-27 RX ADMIN — OXYCODONE HYDROCHLORIDE AND ACETAMINOPHEN 1 TABLET: 5; 325 TABLET ORAL at 18:46

## 2022-03-27 NOTE — ED PROVIDER NOTES
Sobia Salomon is a 51-year-old female that presents emergency department today for a fall.  She is complaining of right wrist pain, head injury, and neck pain status post her fall earlier today.  She states that she tripped going up the stairs.  She rates her overall pain 8 out of 10.  She reports a previous right wrist surgery this past October.  She denies any loss of consciousness, taking blood thinners, nausea or vomiting.  No further complaints today.          Review of Systems   Musculoskeletal: Positive for arthralgias, myalgias and neck pain.   Skin: Positive for wound.   All other systems reviewed and are negative.      Past Medical History:   Diagnosis Date   • Anxiety    • Breast lump    • De Quervain's disease (tenosynovitis) 02/28/2018    Right   • Depression    • IBS (irritable bowel syndrome)    • PVC (premature ventricular contraction)    • Seasonal allergies        Allergies   Allergen Reactions   • Amoxicillin-Pot Clavulanate Hives   • Flonase [Fluticasone] Swelling   • Latex Hives   • Metronidazole Hives   • Paxil [Paroxetine] Hives   • Prednisone Anxiety     All oral and IM steroids   • Wellbutrin [Bupropion] Anxiety       Past Surgical History:   Procedure Laterality Date   • ARTHROPLASTY      Surgical Clips   • BREAST SURGERY     • COLONOSCOPY  07/2016   • FOOT SURGERY Left        Family History   Problem Relation Age of Onset   • Cancer Mother         Unspecified   • Osteoporosis Mother    • Breast cancer Other    • Diabetes Son         Unspecified type       Social History     Socioeconomic History   • Marital status:    Tobacco Use   • Smoking status: Former Smoker   • Smokeless tobacco: Never Used   Vaping Use   • Vaping Use: Never used   Substance and Sexual Activity   • Alcohol use: Yes     Comment: occ   • Drug use: Never           Objective   Physical Exam  Vitals and nursing note reviewed.   Constitutional:       General: She is not in acute distress.     Appearance: Normal  appearance. She is not ill-appearing, toxic-appearing or diaphoretic.   HENT:      Head: Normocephalic.      Nose: Nose normal.      Mouth/Throat:      Mouth: Mucous membranes are moist.   Eyes:      General: No scleral icterus.     Extraocular Movements: Extraocular movements intact.      Pupils: Pupils are equal, round, and reactive to light.   Neck:      Comments: Cervical tenderness absent, right cervical paraspinal tender on palpation.  Cardiovascular:      Rate and Rhythm: Normal rate and regular rhythm.      Pulses: Normal pulses.      Heart sounds: Normal heart sounds.   Pulmonary:      Effort: Pulmonary effort is normal. No respiratory distress.      Breath sounds: Normal breath sounds.   Abdominal:      General: Abdomen is flat.      Palpations: Abdomen is soft.      Tenderness: There is no abdominal tenderness.   Musculoskeletal:         General: Tenderness and signs of injury present. No swelling or deformity.      Cervical back: Normal range of motion and neck supple. Tenderness present.      Right lower leg: No edema.      Left lower leg: No edema.      Comments: Decreased range of motion to right wrist.   Skin:     General: Skin is warm.      Capillary Refill: Capillary refill takes less than 2 seconds.      Comments: Small abrasion to patient's scalp.  No laceration.   Neurological:      General: No focal deficit present.      Mental Status: She is alert and oriented to person, place, and time. Mental status is at baseline.      Cranial Nerves: No cranial nerve deficit.      Sensory: No sensory deficit.      Motor: No weakness.      Coordination: Coordination normal.      Gait: Gait normal.   Psychiatric:         Mood and Affect: Mood normal.         Behavior: Behavior normal.         Thought Content: Thought content normal.         Judgment: Judgment normal.         Procedures           ED Course                                                 MDM  Number of Diagnoses or Management  Options  Diagnosis management comments: Vitals stable, no acute distress, afebrile.  Patient fell walking up the stairs and tripped on a stair.    She is neurologically intact with sensation intact and no focal deficits noted.  X-ray of right wrist shows no acute findings.    Full work-up shows no emergent findings.  Told patient to rest, ice, compress, elevate.  Told her to wear her wrist immobilizer for at least a week to protect it.  Educated her on worrisome symptoms to return for and she verbalized understanding.  Educated her on wound care for her head and she verbalized understanding.  I feel she is safe to discharge home at this time as she is resting, no acute distress.       Amount and/or Complexity of Data Reviewed  Tests in the radiology section of CPT®: reviewed and ordered    Risk of Complications, Morbidity, and/or Mortality  Presenting problems: moderate  Diagnostic procedures: moderate  Management options: moderate    Patient Progress  Patient progress: stable      Final diagnoses:   Sprain of right wrist, initial encounter   Fall, initial encounter   Neck pain   Injury of head, initial encounter       ED Disposition  ED Disposition     ED Disposition   Discharge    Condition   Stable    Comment   --             [facility] EMERGENCY ROOM  [address] [address] [phone]  Go to   If symptoms worsen         Medication List      No changes were made to your prescriptions during this visit.          Sammie Domingo APRN  03/27/22 1853       Sammie Domingo APRN  03/27/22 1900

## 2022-05-03 ENCOUNTER — TREATMENT (OUTPATIENT)
Dept: PHYSICAL THERAPY | Facility: CLINIC | Age: 52
End: 2022-05-03

## 2022-05-03 ENCOUNTER — TRANSCRIBE ORDERS (OUTPATIENT)
Dept: PHYSICAL THERAPY | Facility: CLINIC | Age: 52
End: 2022-05-03

## 2022-05-03 DIAGNOSIS — M25.532 BILATERAL WRIST PAIN: ICD-10-CM

## 2022-05-03 DIAGNOSIS — M25.531 BILATERAL WRIST PAIN: Primary | ICD-10-CM

## 2022-05-03 DIAGNOSIS — S62.115A CLOSED NONDISPLACED FRACTURE OF TRIQUETRUM OF LEFT WRIST, INITIAL ENCOUNTER: ICD-10-CM

## 2022-05-03 DIAGNOSIS — M25.531 RIGHT WRIST PAIN: Primary | ICD-10-CM

## 2022-05-03 DIAGNOSIS — M25.532 LEFT WRIST PAIN: ICD-10-CM

## 2022-05-03 DIAGNOSIS — M25.531 BILATERAL WRIST PAIN: ICD-10-CM

## 2022-05-03 DIAGNOSIS — M25.532 BILATERAL WRIST PAIN: Primary | ICD-10-CM

## 2022-05-03 PROCEDURE — 97110 THERAPEUTIC EXERCISES: CPT | Performed by: OCCUPATIONAL THERAPIST

## 2022-05-03 PROCEDURE — 97166 OT EVAL MOD COMPLEX 45 MIN: CPT | Performed by: OCCUPATIONAL THERAPIST

## 2022-05-03 PROCEDURE — 97022 WHIRLPOOL THERAPY: CPT | Performed by: OCCUPATIONAL THERAPIST

## 2022-05-03 PROCEDURE — 97112 NEUROMUSCULAR REEDUCATION: CPT | Performed by: OCCUPATIONAL THERAPIST

## 2022-05-03 NOTE — PROGRESS NOTES
Outpatient Occupational Therapy Ortho Initial Evaluation    Patient: Naima Perkins   : 1970  Diagnosis/ICD-10 Code:  Right wrist pain [M25.531]  Referring practitioner: Rigoberto Taylor MD  Date of Initial Visit: 5/3/2022  Today's Date: 5/3/2022  Patient seen for 1 sessions               Subjective Questionnaire: QuickDASH: 38      Subjective Evaluation    History of Present Illness  Date of onset: 3/27/2022  Mechanism of injury: 3/27/22 tripped on porch and fell on both wrist.  L wrist triquetral fracture, non casted.  R wrist is painful along ulnar side of wrist.  Currently taking pain medication as needed.         Patient Occupation: nurse   Precautions and Work Restrictions: off duty Quality of life: excellent    Pain  Current pain ratin  At worst pain ratin  Location: R  and L ulnar side of wrist  Quality: burning  Relieving factors: medications (towel rub)  Exacerbated by: driving; lifting granddaughter.  Progression: worsening    Social Support  Lives in: multiple-level home  Lives with: spouse and young children    Hand dominance: right    Diagnostic Tests  X-ray: abnormal    Patient Goals  Patient goals for therapy: decreased edema, increased motion, independence with ADLs/IADLs, return to work and increased strength           Past Medical hx:    Objective          Neurological Testing     Sensation     Wrist/Hand   Left   Intact: light touch    Right   Intact: light touch    Active Range of Motion     Left Wrist   Wrist flexion: 65 degrees   Wrist extension: 75 degrees   Radial deviation: 15 degrees   Ulnar deviation: 35 degrees     Right Wrist   Wrist flexion: 32 degrees   Wrist extension: 45 degrees   Radial deviation: 10 degrees   Ulnar deviation: 10 degrees     Additional Active Range of Motion Details  B hands full fist  Pro/sup full fist    Strength/Myotome Testing     Left Wrist/Hand      (2nd hand position)   Left  strength (2nd hand position) 25 lbs    Right  Wrist/Hand      (2nd hand position)   Right  strength (2nd hand position) 20 lbs          Assessment & Plan     Assessment  Impairments: abnormal coordination, abnormal muscle firing, abnormal or restricted ROM, activity intolerance, impaired physical strength, lacks appropriate home exercise program, pain with function, safety issue and weight-bearing intolerance  Functional Limitations: carrying objects, lifting, pulling, pushing, reaching behind back, reaching overhead and unable to perform repetitive tasks  Goals  Plan Goals: 1. The patient complains of pain in the B wrist                  LTG 1: 12 weeks:  The patient will report a pain rating of 0/10 or better in order to improve sleep quality and tolerance to performance of activities of daily living.                                  STATUS:  New                  STG 1a: 4weeks:  The patient will report a pain rating of 3/10 or better.                                   STATUS:  New  2. The patient has limited ROM of the B wrist                  LTG 2: 12 weeks:  The patient will demonstrate 150 degrees of L wrist KAMINSKI to allow the patient to Weight bear.                                  STATUS:  New                   STG 2a: 4 weeks:  The patient will demonstrate 140 degrees of KAMINSKI.                                  STATUS:  New               LTG 2: 12 weeks:  The patient will demonstrate 120 degrees of R wrist KAMINSKI to allow the patient to cook.                                  STATUS:  New                   STG 2a: 4 weeks:  The patient will demonstrate 100 degrees of KAMINSKI.                                  STATUS:  New                                               3. The patient has limited strength of the B hands.                  LTG 3: 12 weeks:  The patient will demonstrate 40# in order to lift granddaughter.                                  STATUS:  New                  STG 3a: 4 weeks:  The patient will demonstrate tolerance to light strengthening  without adverse reaction.                                  STATUS:  New  4. Carrying, Moving, and Handling Objects Functional Limitation                                   LTG 4: 12 weeks:  The patient will demonstrate 0% limitation by achieving a score of 11 on the Quick DASH.                                  STATUS:  New                  STG 4a: 4 weeks:  The patient will demonstrate 20-39% limitation by achieving a score of 27 on the Quick DASH.                                    STATUS:  New                    TREATMENT: Orthotic fabrication/fitting/management and training, Manual therapy, therapeutic exercise, home exercise instruction, and modalities as needed to include: electrical stimulation, ultrasound, moist heat, paraffin, fluidotherapy and ice.      Plan  Planned modality interventions: TENS, thermotherapy (hydrocollator packs), thermotherapy (paraffin bath), ultrasound and electrical stimulation/Russian stimulation  Other planned modality interventions: fluidotherapy  Planned therapy interventions: manual therapy, ADL retraining, motor coordination training, neuromuscular re-education, soft tissue mobilization, fine motor coordination training, body mechanics training, balance/weight-bearing training, functional ROM exercises, flexibility, spinal/joint mobilization, strengthening, stretching, therapeutic activities, IADL retraining, joint mobilization and home exercise program  Frequency: 2x week  Duration in weeks: 12  Treatment plan discussed with: patient        Patient is indicated for skilled occupational therapy services.    History # of Personal Factors and/or Comorbidities: MODERATE (1-2)  Examination of Body System(s): # of elements: MODERATE (3)  Clinical Presentation: EVOLVING  Clinical Decision Making: MODERATE     Evaluation:  Low Complexity:    0     mins  18500;  Mod Complexity:    15     mins  34801;  High Complexity:    0     mins  95703;    Timed:  Manual Therapy:    0     mins   93747;  Therapeutic Exercise:    10     mins  46868;  Therapeutic Activity:    0     mins  03912;     Neuromuscular Lei:    10    mins  04724;    Ultrasound:     0     mins  80020;    Electrical Stimulation:    0     mins  08682;    Untimed:  Electrical Stimulation:    0     mins  07477 ( );  Fluidotherapy:        10    mins  41013  Paraffin:                          0    mins  20720    Timed Treatment:   20   mins   Total Treatment:     45   mins      OT SIGNATURE: Laura Garcia OTDINORAH, OTR/L, CHT     Electronically signed    [place] LICENSE: [id number]   DATE TREATMENT INITIATED: 5/3/2022    Initial Certification  Certification Period: 5/3/2022 thru 7/31/2022  I certify that the therapy services are furnished while this patient is under my care.  The services outlined above are required by this patient, and will be reviewed every 90 days.     Signature:______________________________________________ PHYSICIAN:  Rigoberto Taylor MD   NPI: [id number]                                      DATE:    Please sign and return via fax to [phone]   Thank you, [facility] Occupational Therapy.

## 2022-05-11 ENCOUNTER — TREATMENT (OUTPATIENT)
Dept: PHYSICAL THERAPY | Facility: CLINIC | Age: 52
End: 2022-05-11

## 2022-05-11 DIAGNOSIS — M25.531 RIGHT WRIST PAIN: Primary | ICD-10-CM

## 2022-05-11 DIAGNOSIS — M25.532 LEFT WRIST PAIN: ICD-10-CM

## 2022-05-11 DIAGNOSIS — S62.115A CLOSED NONDISPLACED FRACTURE OF TRIQUETRUM OF LEFT WRIST, INITIAL ENCOUNTER: ICD-10-CM

## 2022-05-11 PROCEDURE — 97530 THERAPEUTIC ACTIVITIES: CPT | Performed by: OCCUPATIONAL THERAPIST

## 2022-05-11 PROCEDURE — 97112 NEUROMUSCULAR REEDUCATION: CPT | Performed by: OCCUPATIONAL THERAPIST

## 2022-05-11 PROCEDURE — 97022 WHIRLPOOL THERAPY: CPT | Performed by: OCCUPATIONAL THERAPIST

## 2022-05-11 PROCEDURE — 97110 THERAPEUTIC EXERCISES: CPT | Performed by: OCCUPATIONAL THERAPIST

## 2022-05-11 NOTE — PROGRESS NOTES
Occupational Therapy Daily Treatment Note      Patient: Naima Perkins   : 1970  Referring practitioner: Rigoberto Taylor MD  Date of Initial Visit: Type: THERAPY  Noted: 5/3/2022  Today's Date: 2022  Patient seen for 2 sessions    ICD-10-CM ICD-9-CM   1. Right wrist pain  M25.531 719.43   2. Closed nondisplaced fracture of triquetrum of left wrist, initial encounter  S62.115A 814.03   3. Left wrist pain  M25.532 719.43          Naima Perkins reports I am feeling much better today 2/10 R wrist; L 0/10      Objective   See Exercise, Manual, and Modality Logs for complete treatment.   Radial deviation on R wrist increases symptoms.  Difficulty pushing buttons on phone, holding certain positions with R UE.  Kinesiotape applied to R wrist for ulnar Boost and ulnar sided wrist support.    Assessment/Plan  Pt progressing with tolerance for functional gripping and weight bearing.  Pt continues to have intermittent pain B with R worse than L      Cont per POC           Timed:  Manual Therapy:    0     mins  15472;  Therapeutic Exercise:    10     mins  27621;  Therapeutic Activity:    10     mins  58562;     Neuromuscular Lei:    10    mins  24257;    Ultrasound:     0     mins  53093;    Electrical Stimulation:    0     mins  23822;    Untimed:  Electrical Stimulation:    0     mins  49573 ( );  Fluidotherapy:        10    mins  90583  Paraffin:                          0    mins  51431    Timed Treatment:   30   mins   Total Treatment:     40   mins    OT SIGNATURE: WAYLON Hoyos, OTR/L, CHT     Electronically signed    [place] LICENSE: [id number]

## 2022-05-16 ENCOUNTER — TELEPHONE (OUTPATIENT)
Dept: PHYSICAL THERAPY | Facility: OTHER | Age: 52
End: 2022-05-16

## 2022-05-18 ENCOUNTER — TREATMENT (OUTPATIENT)
Dept: PHYSICAL THERAPY | Facility: CLINIC | Age: 52
End: 2022-05-18

## 2022-05-18 DIAGNOSIS — M25.531 BILATERAL WRIST PAIN: Primary | ICD-10-CM

## 2022-05-18 DIAGNOSIS — M25.631 STIFFNESS OF RIGHT WRIST JOINT: ICD-10-CM

## 2022-05-18 DIAGNOSIS — Z98.890 STATUS POST PROXIMAL ROW CARPECTOMY OF WRIST: ICD-10-CM

## 2022-05-18 DIAGNOSIS — M25.532 BILATERAL WRIST PAIN: Primary | ICD-10-CM

## 2022-05-18 PROCEDURE — 97112 NEUROMUSCULAR REEDUCATION: CPT | Performed by: OCCUPATIONAL THERAPIST

## 2022-05-18 PROCEDURE — 97110 THERAPEUTIC EXERCISES: CPT | Performed by: OCCUPATIONAL THERAPIST

## 2022-05-18 PROCEDURE — 97140 MANUAL THERAPY 1/> REGIONS: CPT | Performed by: OCCUPATIONAL THERAPIST

## 2022-05-18 NOTE — PROGRESS NOTES
Occupational Therapy Daily Treatment Note      Patient: Naima Perkins   : 1970  Referring practitioner: Rigoberto Taylor MD  Date of Initial Visit: Type: THERAPY  Noted: 5/3/2022  Today's Date: 2022  Patient seen for 3 sessions    ICD-10-CM ICD-9-CM   1. Bilateral wrist pain  M25.531 719.43    M25.532    2. Stiffness of right wrist joint  M25.631 719.53   3. Status post proximal row carpectomy of wrist  Z98.890 V45.89          Naima Perkins reports she is really hurting today.  Pt reports she has been working out, staying off her wrist and modifying positioning.    Objective   See Exercise, Manual, and Modality Logs for complete treatment.   Pt having ulnar sided wrist pain B.     Assessment/Plan  Pt education on protecting ulnar nerves B and how to perform self distraction with HEP.      Cont per POC           Timed:  Manual Therapy:    10     mins  01650;  Therapeutic Exercise:    10     mins  46224;  Therapeutic Activity:    0     mins  63293;     Neuromuscular Lei:    10    mins  39246;    Ultrasound:     0     mins  65399;    Electrical Stimulation:    0     mins  59830;    Untimed:  Electrical Stimulation:    00     mins  97502 ( );  Fluidotherapy:        0    mins  11856  Paraffin:                          0    mins  56582    Timed Treatment:   30   mins   Total Treatment:     30   mins    OT SIGNATURE: WAYLON Hoyos, OTR/L, CHT     Electronically signed    [place] LICENSE: [id number]

## 2022-05-23 ENCOUNTER — TELEPHONE (OUTPATIENT)
Dept: PHYSICAL THERAPY | Facility: OTHER | Age: 52
End: 2022-05-23

## 2022-05-25 ENCOUNTER — TREATMENT (OUTPATIENT)
Dept: PHYSICAL THERAPY | Facility: CLINIC | Age: 52
End: 2022-05-25

## 2022-05-25 DIAGNOSIS — M25.531 RIGHT WRIST PAIN: ICD-10-CM

## 2022-05-25 DIAGNOSIS — M25.532 BILATERAL WRIST PAIN: Primary | ICD-10-CM

## 2022-05-25 DIAGNOSIS — M25.531 BILATERAL WRIST PAIN: Primary | ICD-10-CM

## 2022-05-25 DIAGNOSIS — S62.115A CLOSED NONDISPLACED FRACTURE OF TRIQUETRUM OF LEFT WRIST, INITIAL ENCOUNTER: ICD-10-CM

## 2022-05-25 DIAGNOSIS — Z98.890 STATUS POST PROXIMAL ROW CARPECTOMY OF WRIST: ICD-10-CM

## 2022-05-25 DIAGNOSIS — M25.532 LEFT WRIST PAIN: ICD-10-CM

## 2022-05-25 DIAGNOSIS — M25.631 STIFFNESS OF RIGHT WRIST JOINT: ICD-10-CM

## 2022-05-25 PROCEDURE — 97110 THERAPEUTIC EXERCISES: CPT | Performed by: OCCUPATIONAL THERAPIST

## 2022-05-25 PROCEDURE — 97530 THERAPEUTIC ACTIVITIES: CPT | Performed by: OCCUPATIONAL THERAPIST

## 2022-05-25 PROCEDURE — 97022 WHIRLPOOL THERAPY: CPT | Performed by: OCCUPATIONAL THERAPIST

## 2022-05-25 PROCEDURE — 97112 NEUROMUSCULAR REEDUCATION: CPT | Performed by: OCCUPATIONAL THERAPIST

## 2022-05-25 NOTE — PROGRESS NOTES
Occupational Therapy Daily Treatment Note      Patient: Naima Perkins   : 1970  Referring practitioner: Rigoberto Taylor MD  Date of Initial Visit: Type: THERAPY  Noted: 5/3/2022  Today's Date: 2022  Patient seen for 4 sessions    ICD-10-CM ICD-9-CM   1. Bilateral wrist pain  M25.531 719.43    M25.532    2. Stiffness of right wrist joint  M25.631 719.53   3. Status post proximal row carpectomy of wrist  Z98.890 V45.89   4. Right wrist pain  M25.531 719.43   5. Closed nondisplaced fracture of triquetrum of left wrist, initial encounter  S62.115A 814.03   6. Left wrist pain  M25.532 719.43          Naima Perkins reports today is a great day.         Objective   See Exercise, Manual, and Modality Logs for complete treatment.   Pt has increased strength and functional gripping and lifting with decreased pain in B wrist.     Assessment/Plan  Getting grippers to increase strengthening at HEP.    Cont per POC           Timed:  Manual Therapy:    0     mins  62434;  Therapeutic Exercise:    10     mins  77162;  Therapeutic Activity:    10     mins  22466;     Neuromuscular Lei:    10    mins  64516;    Ultrasound:     0     mins  05392;    Electrical Stimulation:    0     mins  53111;    Untimed:  Electrical Stimulation:    0     mins  02697 ( );  Fluidotherapy:        10    mins  98671  Paraffin:                          0    mins  35332    Timed Treatment:   30   mins   Total Treatment:     40   mins    OT SIGNATURE: WAYLON Hoyos, OTR/L, CHT     Electronically signed    [place] LICENSE: [id number]

## 2022-05-31 ENCOUNTER — TELEPHONE (OUTPATIENT)
Dept: PHYSICAL THERAPY | Facility: OTHER | Age: 52
End: 2022-05-31

## 2022-06-13 ENCOUNTER — TREATMENT (OUTPATIENT)
Dept: PHYSICAL THERAPY | Facility: CLINIC | Age: 52
End: 2022-06-13

## 2022-06-13 DIAGNOSIS — Z98.890 STATUS POST PROXIMAL ROW CARPECTOMY OF WRIST: ICD-10-CM

## 2022-06-13 DIAGNOSIS — M25.531 BILATERAL WRIST PAIN: Primary | ICD-10-CM

## 2022-06-13 DIAGNOSIS — M25.631 STIFFNESS OF RIGHT WRIST JOINT: ICD-10-CM

## 2022-06-13 DIAGNOSIS — M25.531 RIGHT WRIST PAIN: ICD-10-CM

## 2022-06-13 DIAGNOSIS — M25.532 BILATERAL WRIST PAIN: Primary | ICD-10-CM

## 2022-06-13 PROCEDURE — 97110 THERAPEUTIC EXERCISES: CPT | Performed by: OCCUPATIONAL THERAPIST

## 2022-06-13 PROCEDURE — 97140 MANUAL THERAPY 1/> REGIONS: CPT | Performed by: OCCUPATIONAL THERAPIST

## 2022-06-13 PROCEDURE — 97112 NEUROMUSCULAR REEDUCATION: CPT | Performed by: OCCUPATIONAL THERAPIST

## 2022-06-13 NOTE — PROGRESS NOTES
Re-Assessment / Re-Certification      Patient: Naima Perkins   : 1970  Diagnosis/ICD-10 Code:  Bilateral wrist pain [M25.531, M25.532]  Referring practitioner: Rigoberto Taylor MD  Date of Initial Visit: Type: THERAPY  Noted: 5/3/2022  Today's Date: 2022  Patient seen for 5 sessions      Subjective:   Naima Perkins reports: I am having pain in R ulnar side.  Subjective Questionnaire: QuickDASH: 33  Clinical Progress: improved  Home Program Compliance: Yes  Treatment has included: therapeutic exercise, neuromuscular re-education, manual therapy and therapeutic activity      Objective   Left Wrist   Wrist flexion: 75 degrees   Wrist extension: 85 degrees   Radial deviation: 20 degrees   Ulnar deviation: 40 degrees      Right Wrist   Wrist flexion: 55 degrees   Wrist extension: 50 degrees   Radial deviation: 15 degrees   Ulnar deviation: 10 degrees      Strength/Myotome Testing      Left Wrist/Hand       (2nd hand position)   Left  strength (2nd hand position) 45 lbs     Right Wrist/Hand       (2nd hand position)   Right  strength (2nd hand position) 35 lbs   Assessment/Plan    Visit Diagnoses:    ICD-10-CM ICD-9-CM   1. Bilateral wrist pain  M25.531 719.43    M25.532    2. Stiffness of right wrist joint  M25.631 719.53   3. Status post proximal row carpectomy of wrist  Z98.890 V45.89   4. Right wrist pain  M25.531 719.43       Progress toward previous goals: Partially Met    Plan Goals: 1. The patient complains of pain in the B wrist                  LTG 1: 12 weeks:  The patient will report a pain rating of 0/10 or better in order to improve sleep quality and tolerance to performance of activities of daily living.                                  STATUS:  MET                  STG 1a: 4weeks:  The patient will report a pain rating of 3/10 or better.                                   STATUS:  MET  2. The patient has limited ROM of the B wrist                  LTG 2: 12 weeks:   The patient will demonstrate 150 degrees of L wrist KAMINSKI to allow the patient to Weight bear.                                  STATUS:  MET                  STG 2a: 4 weeks:  The patient will demonstrate 140 degrees of KAMINSKI.                                  STATUS:  MET                     LTG 2: 12 weeks:  The patient will demonstrate 120 degrees of R wrist KAMINSKI to allow the patient to cook.                                  STATUS:  NOT MET                  STG 2a: 4 weeks:  The patient will demonstrate 100 degrees of KAMINSKI.                                  STATUS:  MET                                               3. The patient has limited strength of the B hands.                  LTG 3: 12 weeks:  The patient will demonstrate 40# in order to lift granddaughter.                                  STATUS:  PARTIALLY MET                  STG 3a: 4 weeks:  The patient will demonstrate tolerance to light strengthening without adverse reaction.                                  STATUS:  MET  4. Carrying, Moving, and Handling Objects Functional Limitation                                   LTG 4: 12 weeks:  The patient will demonstrate 0% limitation by achieving a score of 11 on the Quick DASH.                                  STATUS:  NOT MET                  STG 4a: 4 weeks:  The patient will demonstrate 20-39% limitation by achieving a score of 27 on the Quick DASH.                                    STATUS:  NOT MET      Recommendations: Discharge to [facility]  Prognosis to achieve goals: good      OT SIGNATURE: WAYLON Hoyos, OTR/L, CHT     Electronically signed    [place] LICENSE: [id number]       Timed:  Manual Therapy:    10     mins  35345;  Therapeutic Exercise:    10     mins  73674;  Therapeutic Activity:    0     mins  32013;     Neuromuscular Lei:    10    mins  40512;    Ultrasound:     0     mins  16503;    Electrical Stimulation:    0     mins  01942;    Untimed:  Electrical Stimulation:    0     mins  86842 (  );  Fluidotherapy:        0    mins  83439  Paraffin:                          0    mins  66082    Timed Treatment:   30   mins   Total Treatment:     30   mins

## 2022-07-18 NOTE — PROGRESS NOTES
Well Woman Visit    CC: Scheduled annual well gyn visit  Chief Complaint   Patient presents with   • Annual Exam       [facility] intake in the past?: Yes    Previously Qualified? NO    Post menopausal no HRT    HPI:   52 y.o.       Fred Galvez MD    History: PMHx, Meds, Allergies, PSHx, Social Hx, and POBHx all reviewed and updated.    Pt has no complaints today.    PHYSICAL EXAM:  /61   Pulse (!) 45   Wt 58.1 kg (128 lb)   BMI 21.30 kg/m²  Not found.  General- NAD, alert and oriented, appropriate  Psych- Normal mood, good memory  Neck- No masses, no thyroid enlargement  CV- Regular rhythm, no murnurs  Resp- CTA to bases, no wheezes  Abdomen- Soft, non distended, non tender, no masses    Breast left-  Bilaterally symmetrical, no masses, non tender, no nipple discharge  Breast right- Bilaterally symmetrical, no masses, non tender, no nipple discharge    External genitalia- Normal female, no lesions  Urethra/meatus- Normal, no masses, non tender  Bladder- Normal, no masses, non tender  Vagina- Normal, no atrophy, no lesions, no discharge.  Prolapse: No prolapse  Cvx- Normal, no lesions, no discharge, No cervical motion tenderness  Uterus- Normal size, shape & consistency.  Non tender, mobile, & no prolapse  Adnexa- No mass, non tender  Anus/Rectum/Perineum- Not performed    Lymphatic- No palpable neck, axillary, or groin nodes  Ext- No edema, no cyanosis    Skin- No lesions, no rashes, no acanthosis nigricans      ASSESSMENT and PLAN:    Diagnoses and all orders for this visit:    1. Well woman exam with routine gynecological exam (Primary)  -     Mammo Screening Digital Tomosynthesis Bilateral With CAD; Future    2. Herpes, vulvar  -     valACYclovir (VALTREX) 1000 MG tablet; Take 1 tablet by mouth Daily.  Dispense: 90 tablet; Refill: 3        Preventative:  • BREAST HEALTH- Monthly self breast exam importance and how to reviewed. MMG and/or MRI (prn) reviewed per society guidelines and her  individual history. Screen: Pt will call to schedule  • CERVICAL CANCER Screening- Reviewed current ASCCP guidelines for screening w and wo cotest HPV, age specific.  Screen: Updated today  • COLON CANCER Screening- Reviewed current medical society guidelines and options.  Screen:  Already up to date  • Smoking status- NON SMOKER  • Follow up PCP/Specialist PMHx and Labs      She understands the importance of having any ordered tests to be performed in a timely fashion.  The risks of not performing them include, but are not limited to, advanced cancer stages, bone loss from osteoporosis and/or subsequent increase in morbidity and/or mortality.  She is encouraged to review her results online and/or contact or office if she has questions.     Follow Up:  No follow-ups on file.            Naomie Bellamy MD  07/19/2022    [facility]  [facility] [place] [address]  Dept: [phone]  Dept Fax: [phone]  Loc: [phone]  Loc Fax: [phone]

## 2022-07-19 ENCOUNTER — OFFICE VISIT (OUTPATIENT)
Dept: OBSTETRICS AND GYNECOLOGY | Facility: CLINIC | Age: 52
End: 2022-07-19

## 2022-07-19 VITALS
BODY MASS INDEX: 21.3 KG/M2 | SYSTOLIC BLOOD PRESSURE: 103 MMHG | DIASTOLIC BLOOD PRESSURE: 61 MMHG | HEART RATE: 45 BPM | WEIGHT: 128 LBS

## 2022-07-19 DIAGNOSIS — A60.04 HERPES, VULVAR: ICD-10-CM

## 2022-07-19 DIAGNOSIS — Z01.419 WELL WOMAN EXAM WITH ROUTINE GYNECOLOGICAL EXAM: Primary | ICD-10-CM

## 2022-07-19 PROCEDURE — 87624 HPV HI-RISK TYP POOLED RSLT: CPT | Performed by: OBSTETRICS & GYNECOLOGY

## 2022-07-19 PROCEDURE — 99396 PREV VISIT EST AGE 40-64: CPT | Performed by: OBSTETRICS & GYNECOLOGY

## 2022-07-19 PROCEDURE — G0123 SCREEN CERV/VAG THIN LAYER: HCPCS | Performed by: OBSTETRICS & GYNECOLOGY

## 2022-07-19 RX ORDER — ALPRAZOLAM 0.5 MG/1
TABLET ORAL
COMMUNITY

## 2022-07-19 RX ORDER — VALACYCLOVIR HYDROCHLORIDE 1 G/1
1000 TABLET, FILM COATED ORAL DAILY
Qty: 90 TABLET | Refills: 3 | Status: SHIPPED | OUTPATIENT
Start: 2022-07-19

## 2022-07-19 RX ORDER — VALACYCLOVIR HYDROCHLORIDE 1 G/1
TABLET, FILM COATED ORAL
COMMUNITY
End: 2022-07-19 | Stop reason: SDUPTHER

## 2022-07-19 RX ORDER — IBUPROFEN 800 MG/1
TABLET ORAL
COMMUNITY

## 2022-07-25 LAB
CYTOLOGIST CVX/VAG CYTO: NORMAL
CYTOLOGY CVX/VAG DOC CYTO: NORMAL
CYTOLOGY CVX/VAG DOC THIN PREP: NORMAL
DX ICD CODE: NORMAL
HIV 1 & 2 AB SER-IMP: NORMAL
HPV I/H RISK 4 DNA CVX QL PROBE+SIG AMP: NEGATIVE
OTHER STN SPEC: NORMAL
STAT OF ADQ CVX/VAG CYTO-IMP: NORMAL

## 2022-09-20 ENCOUNTER — HOSPITAL ENCOUNTER (OUTPATIENT)
Dept: MAMMOGRAPHY | Facility: HOSPITAL | Age: 52
Discharge: HOME | End: 2022-09-20
Admitting: OBSTETRICS & GYNECOLOGY

## 2022-09-20 DIAGNOSIS — Z01.419 WELL WOMAN EXAM WITH ROUTINE GYNECOLOGICAL EXAM: ICD-10-CM

## 2022-09-20 PROCEDURE — 77063 BREAST TOMOSYNTHESIS BI: CPT

## 2022-09-20 PROCEDURE — 77067 SCR MAMMO BI INCL CAD: CPT

## 2023-01-12 ENCOUNTER — HOSPITAL ENCOUNTER (EMERGENCY)
Facility: HOSPITAL | Age: 53
Discharge: HOME | End: 2023-01-12
Attending: EMERGENCY MEDICINE | Admitting: EMERGENCY MEDICINE
Payer: COMMERCIAL

## 2023-01-12 ENCOUNTER — APPOINTMENT (OUTPATIENT)
Dept: GENERAL RADIOLOGY | Facility: HOSPITAL | Age: 53
End: 2023-01-12
Payer: COMMERCIAL

## 2023-01-12 VITALS
SYSTOLIC BLOOD PRESSURE: 110 MMHG | OXYGEN SATURATION: 99 % | HEIGHT: 65 IN | HEART RATE: 64 BPM | WEIGHT: 131.61 LBS | RESPIRATION RATE: 18 BRPM | DIASTOLIC BLOOD PRESSURE: 44 MMHG | BODY MASS INDEX: 21.93 KG/M2 | TEMPERATURE: 97.6 F

## 2023-01-12 DIAGNOSIS — F41.9 ANXIETY: Primary | ICD-10-CM

## 2023-01-12 LAB
ALBUMIN SERPL-MCNC: 4.4 G/DL (ref 3.5–5.2)
ALBUMIN/GLOB SERPL: 1.9 G/DL
ALP SERPL-CCNC: 93 U/L (ref 39–117)
ALT SERPL W P-5'-P-CCNC: 16 U/L (ref 1–33)
ANION GAP SERPL CALCULATED.3IONS-SCNC: 7.7 MMOL/L (ref 5–15)
AST SERPL-CCNC: 19 U/L (ref 1–32)
BACTERIA UR QL AUTO: ABNORMAL /HPF
BASOPHILS # BLD AUTO: 0.04 10*3/MM3 (ref 0–0.2)
BASOPHILS NFR BLD AUTO: 0.9 % (ref 0–1.5)
BILIRUB SERPL-MCNC: 0.5 MG/DL (ref 0–1.2)
BILIRUB UR QL STRIP: NEGATIVE
BUN SERPL-MCNC: 10 MG/DL (ref 6–20)
BUN/CREAT SERPL: 14.3 (ref 7–25)
CALCIUM SPEC-SCNC: 9.7 MG/DL (ref 8.6–10.5)
CHLORIDE SERPL-SCNC: 104 MMOL/L (ref 98–107)
CLARITY UR: CLEAR
CO2 SERPL-SCNC: 27.3 MMOL/L (ref 22–29)
COLOR UR: YELLOW
CREAT SERPL-MCNC: 0.7 MG/DL (ref 0.57–1)
DEPRECATED RDW RBC AUTO: 38.4 FL (ref 37–54)
EGFRCR SERPLBLD CKD-EPI 2021: 102.9 ML/MIN/1.73
EOSINOPHIL # BLD AUTO: 0.07 10*3/MM3 (ref 0–0.4)
EOSINOPHIL NFR BLD AUTO: 1.5 % (ref 0.3–6.2)
ERYTHROCYTE [DISTWIDTH] IN BLOOD BY AUTOMATED COUNT: 12.6 % (ref 12.3–15.4)
GLOBULIN UR ELPH-MCNC: 2.3 GM/DL
GLUCOSE SERPL-MCNC: 88 MG/DL (ref 65–99)
GLUCOSE UR STRIP-MCNC: NEGATIVE MG/DL
HCT VFR BLD AUTO: 38.8 % (ref 34–46.6)
HGB BLD-MCNC: 13.7 G/DL (ref 12–15.9)
HGB UR QL STRIP.AUTO: NEGATIVE
HOLD SPECIMEN: NORMAL
HOLD SPECIMEN: NORMAL
HYALINE CASTS UR QL AUTO: ABNORMAL /LPF
IMM GRANULOCYTES # BLD AUTO: 0.01 10*3/MM3 (ref 0–0.05)
IMM GRANULOCYTES NFR BLD AUTO: 0.2 % (ref 0–0.5)
KETONES UR QL STRIP: NEGATIVE
LEUKOCYTE ESTERASE UR QL STRIP.AUTO: ABNORMAL
LYMPHOCYTES # BLD AUTO: 1.98 10*3/MM3 (ref 0.7–3.1)
LYMPHOCYTES NFR BLD AUTO: 42.7 % (ref 19.6–45.3)
MCH RBC QN AUTO: 29.6 PG (ref 26.6–33)
MCHC RBC AUTO-ENTMCNC: 35.3 G/DL (ref 31.5–35.7)
MCV RBC AUTO: 83.8 FL (ref 79–97)
MONOCYTES # BLD AUTO: 0.4 10*3/MM3 (ref 0.1–0.9)
MONOCYTES NFR BLD AUTO: 8.6 % (ref 5–12)
NEUTROPHILS NFR BLD AUTO: 2.14 10*3/MM3 (ref 1.7–7)
NEUTROPHILS NFR BLD AUTO: 46.1 % (ref 42.7–76)
NITRITE UR QL STRIP: NEGATIVE
NRBC BLD AUTO-RTO: 0 /100 WBC (ref 0–0.2)
NT-PROBNP SERPL-MCNC: 77.6 PG/ML (ref 0–900)
PH UR STRIP.AUTO: 7 [PH] (ref 5–8)
PLATELET # BLD AUTO: 193 10*3/MM3 (ref 140–450)
PMV BLD AUTO: 10.4 FL (ref 6–12)
POTASSIUM SERPL-SCNC: 3.8 MMOL/L (ref 3.5–5.2)
PROT SERPL-MCNC: 6.7 G/DL (ref 6–8.5)
PROT UR QL STRIP: NEGATIVE
QT INTERVAL: 445 MS
RBC # BLD AUTO: 4.63 10*6/MM3 (ref 3.77–5.28)
RBC # UR STRIP: ABNORMAL /HPF
REF LAB TEST METHOD: ABNORMAL
SODIUM SERPL-SCNC: 139 MMOL/L (ref 136–145)
SP GR UR STRIP: <=1.005 (ref 1–1.03)
SQUAMOUS #/AREA URNS HPF: ABNORMAL /HPF
TROPONIN T SERPL-MCNC: <0.01 NG/ML (ref 0–0.03)
UROBILINOGEN UR QL STRIP: ABNORMAL
WBC # UR STRIP: ABNORMAL /HPF
WBC NRBC COR # BLD: 4.64 10*3/MM3 (ref 3.4–10.8)
WHOLE BLOOD HOLD COAG: NORMAL
WHOLE BLOOD HOLD SPECIMEN: NORMAL

## 2023-01-12 PROCEDURE — 93010 ELECTROCARDIOGRAM REPORT: CPT | Performed by: INTERNAL MEDICINE

## 2023-01-12 PROCEDURE — 93005 ELECTROCARDIOGRAM TRACING: CPT

## 2023-01-12 PROCEDURE — 81001 URINALYSIS AUTO W/SCOPE: CPT | Performed by: EMERGENCY MEDICINE

## 2023-01-12 PROCEDURE — 85025 COMPLETE CBC W/AUTO DIFF WBC: CPT

## 2023-01-12 PROCEDURE — 96374 THER/PROPH/DIAG INJ IV PUSH: CPT

## 2023-01-12 PROCEDURE — 71045 X-RAY EXAM CHEST 1 VIEW: CPT

## 2023-01-12 PROCEDURE — 99283 EMERGENCY DEPT VISIT LOW MDM: CPT

## 2023-01-12 PROCEDURE — 84484 ASSAY OF TROPONIN QUANT: CPT

## 2023-01-12 PROCEDURE — 93005 ELECTROCARDIOGRAM TRACING: CPT | Performed by: EMERGENCY MEDICINE

## 2023-01-12 PROCEDURE — 80053 COMPREHEN METABOLIC PANEL: CPT

## 2023-01-12 PROCEDURE — 25010000002 LORAZEPAM PER 2 MG: Performed by: EMERGENCY MEDICINE

## 2023-01-12 PROCEDURE — 83880 ASSAY OF NATRIURETIC PEPTIDE: CPT

## 2023-01-12 RX ORDER — LORAZEPAM 2 MG/ML
1 INJECTION INTRAMUSCULAR ONCE
Status: COMPLETED | OUTPATIENT
Start: 2023-01-12 | End: 2023-01-12

## 2023-01-12 RX ORDER — LORAZEPAM 1 MG/1
1 TABLET ORAL EVERY 8 HOURS PRN
Qty: 10 TABLET | Refills: 0 | Status: SHIPPED | OUTPATIENT
Start: 2023-01-12

## 2023-01-12 RX ORDER — SODIUM CHLORIDE 0.9 % (FLUSH) 0.9 %
10 SYRINGE (ML) INJECTION AS NEEDED
Status: DISCONTINUED | OUTPATIENT
Start: 2023-01-12 | End: 2023-01-12 | Stop reason: HOSPADM

## 2023-01-12 RX ADMIN — LORAZEPAM 1 MG: 2 INJECTION INTRAMUSCULAR; INTRAVENOUS at 11:25

## 2023-01-12 NOTE — ED PROVIDER NOTES
"Time: 9:40 AM EST  Date of encounter:  1/12/2023  Independent Historian/Clinical History and Information was obtained by:   Patient  Chief Complaint: chest pain, shortness of breath, anxiety     History is limited by: N/A    History of Present Illness:  Patient is a 52 y.o. year old female who presents to the emergency department for evaluation of chest pain, shortness of breath, anxiety. Patient arrived to ED via car from home. Patient states that she has overall weakness and fatigue. Patient states that she is feeling anxious. Patient states that she started calcium supplement 3-4 days ago and stopped 2 days ago. Patient states that she saw no relief of her symptoms from the calcium supplement. Patient also reports of polydipsia. Patient states that she is postmenopausal. Patient states that her pain feels like as if \"her blood pressure is rising.\" Patient denies fever and vaginal discharge.        Chest Pain  Duration:  4 days  Context: stress    Context comment:  New medication restarted Calcium   Ineffective treatments: calcium supplement   Associated symptoms: anxiety, fatigue, numbness, shortness of breath and weakness    Associated symptoms: no abdominal pain, no cough, no fever, no headache, no nausea, no palpitations and no vomiting    Risk factors: smoking (former )    Risk factors comment:  Anxiety and depression   Shortness of Breath  Associated symptoms: chest pain    Associated symptoms: no abdominal pain, no cough, no fever, no headaches, no sore throat and no vomiting    Anxiety  Symptoms include chest pain, nervous/anxious behavior and shortness of breath. Patient reports no nausea or palpitations.           Patient Care Team  Primary Care Provider: Fred Galvez MD    Past Medical History:     Allergies   Allergen Reactions   • Amoxicillin-Pot Clavulanate Hives   • Flonase [Fluticasone] Swelling   • Latex Hives   • Metronidazole Hives   • Paxil [Paroxetine] Hives   • Prednisone Anxiety     " All oral and IM steroids   • Wellbutrin [Bupropion] Anxiety     Past Medical History:   Diagnosis Date   • Anxiety    • Breast lump    • De Quervain's disease (tenosynovitis) 02/28/2018    Right   • Depression    • Herpes    • IBS (irritable bowel syndrome)    • PVC (premature ventricular contraction)    • Seasonal allergies      Past Surgical History:   Procedure Laterality Date   • ARTHROPLASTY      Surgical Clips   • BREAST SURGERY     • COLONOSCOPY  07/2016   • FOOT SURGERY Left     surgical repair 8/2021     Family History   Problem Relation Age of Onset   • Cancer Mother         Unspecified   • Osteoporosis Mother    • Breast cancer Other    • Diabetes Son         Unspecified type       Home Medications:  Prior to Admission medications    Medication Sig Start Date End Date Taking? Authorizing Provider   ALPRAZolam (XANAX) 0.5 MG tablet alprazolam 0.5 mg tablet    Jeffry Bolivar MD   calcium citrate-vitamin d (CALCITRATE) 315-250 MG-UNIT tablet tablet calcium citrate 315 mg calcium-vitamin D3 6.25 mcg (250 unit) tablet    ProviderJeffry MD   ibuprofen (ADVIL,MOTRIN) 800 MG tablet ibuprofen 800 mg tablet   TAKE 1 TABLET BY MOUTH EVERY 8 HOURS AS NEEDED    Jeffry Bolivar MD   saccharomyces boulardii (FLORASTOR) 250 MG capsule Take 250 mg by mouth 2 (Two) Times a Day.    Jeffry Bolivar MD   valACYclovir (VALTREX) 1000 MG tablet Take 1 tablet by mouth Daily. 7/19/22   Naomie Bellamy MD   zinc sulfate (ZINCATE) 220 (50 Zn) MG capsule Take 220 mg by mouth Daily.    ProviderJeffry MD        Social History:   Social History     Tobacco Use   • Smoking status: Former   • Smokeless tobacco: Never   Vaping Use   • Vaping Use: Never used   Substance Use Topics   • Alcohol use: Yes     Comment: occ   • Drug use: Never         Review of Systems:  Review of Systems   Constitutional: Positive for fatigue. Negative for chills and fever.   HENT: Negative for congestion, rhinorrhea and sore  "throat.    Eyes: Negative for pain and visual disturbance.   Respiratory: Positive for shortness of breath. Negative for apnea, cough and chest tightness.    Cardiovascular: Positive for chest pain. Negative for palpitations.   Gastrointestinal: Negative for abdominal pain, diarrhea, nausea and vomiting.   Endocrine: Positive for polydipsia.   Genitourinary: Negative for difficulty urinating, dysuria and vaginal discharge.   Musculoskeletal: Negative for joint swelling and myalgias.   Skin: Negative for color change.   Neurological: Positive for weakness and numbness. Negative for seizures and headaches.   Psychiatric/Behavioral: The patient is nervous/anxious.    All other systems reviewed and are negative.       Physical Exam:  /44   Pulse 64   Temp 97.6 °F (36.4 °C) (Oral)   Resp 18   Ht 165.1 cm (65\")   Wt 59.7 kg (131 lb 9.8 oz)   SpO2 99%   BMI 21.90 kg/m²     Physical Exam  Vitals and nursing note reviewed.   Constitutional:       General: She is not in acute distress.     Appearance: Normal appearance. She is not toxic-appearing.   HENT:      Head: Normocephalic and atraumatic.      Jaw: There is normal jaw occlusion.   Eyes:      General: Lids are normal.      Extraocular Movements: Extraocular movements intact.      Conjunctiva/sclera: Conjunctivae normal.      Pupils: Pupils are equal, round, and reactive to light.   Cardiovascular:      Rate and Rhythm: Normal rate and regular rhythm.      Pulses: Normal pulses.      Heart sounds: Normal heart sounds.   Pulmonary:      Effort: Pulmonary effort is normal. No respiratory distress.      Breath sounds: Normal breath sounds. No wheezing or rhonchi.   Abdominal:      General: Abdomen is flat.      Palpations: Abdomen is soft.      Tenderness: There is no abdominal tenderness. There is no guarding or rebound.   Musculoskeletal:         General: Normal range of motion.      Cervical back: Normal range of motion and neck supple.      Right lower leg: " No edema.      Left lower leg: No edema.   Skin:     General: Skin is warm and dry.   Neurological:      Mental Status: She is alert and oriented to person, place, and time. Mental status is at baseline.   Psychiatric:         Mood and Affect: Mood normal.                  Procedures:  Procedures      Medical Decision Making:      Comorbidities that affect care:    None    External Notes reviewed:    Previous Clinic Note and Previous ED Note      The following orders were placed and all results were independently analyzed by me:  Orders Placed This Encounter   Procedures   • XR Chest 1 View   • [place] Draw   • Comprehensive Metabolic Panel   • BNP   • Troponin   • CBC Auto Differential   • Urinalysis With Microscopic If Indicated (No Culture) - Urine, Clean Catch   • Urinalysis, Microscopic Only - Urine, Clean Catch   • NPO Diet NPO Type: Strict NPO   • Undress & Gown   • Cardiac Monitoring   • Continuous Pulse Oximetry   • Vital Signs   • Oxygen Therapy- Nasal Cannula; 2 LPM; Titrate for SPO2: equal to or greater than, 92%   • ECG 12 Lead ED Triage Standing Order; SOA   • Insert Peripheral IV   • CBC & Differential   • Green Top (Gel)   • Lavender Top   • Gold Top - SST   • Light Blue Top       Medications Given in the Emergency Department:  Medications   sodium chloride 0.9 % flush 10 mL (has no administration in time range)   LORazepam (ATIVAN) injection 1 mg (1 mg Intravenous Given 1/12/23 1125)        ED Course:    ED Course as of 01/12/23 1234   Thu Jan 12, 2023   1231 EKG:    Rhythm: sinus  Rate: 56  Axis: normal  Intervals: normal  ST Segment: no elevations    EKG Comparison: unchanged    Interpreted by me   [BN]      ED Course User Index  [BN] Romulo Camarena MD       Labs:    Lab Results (last 24 hours)     Procedure Component Value Units Date/Time    CBC & Differential [[id number]]  (Normal) Collected: 01/12/23 0746    Specimen: Blood Updated: 01/12/23 0801    Narrative:      The following orders were  created for panel order CBC & Differential.  Procedure                               Abnormality         Status                     ---------                               -----------         ------                     CBC Auto Differential[[id number]]        Normal              Final result                 Please view results for these tests on the individual orders.    Comprehensive Metabolic Panel [[id number]] Collected: 01/12/23 0746    Specimen: Blood Updated: 01/12/23 0828     Glucose 88 mg/dL      BUN 10 mg/dL      Creatinine 0.70 mg/dL      Sodium 139 mmol/L      Potassium 3.8 mmol/L      Chloride 104 mmol/L      CO2 27.3 mmol/L      Calcium 9.7 mg/dL      Total Protein 6.7 g/dL      Albumin 4.4 g/dL      ALT (SGPT) 16 U/L      AST (SGOT) 19 U/L      Alkaline Phosphatase 93 U/L      Total Bilirubin 0.5 mg/dL      Globulin 2.3 gm/dL      A/G Ratio 1.9 g/dL      BUN/Creatinine Ratio 14.3     Anion Gap 7.7 mmol/L      eGFR 102.9 mL/min/1.73      Comment: National Kidney Foundation and American Society of Nephrology (ASN) Task Force recommended calculation based on the Chronic Kidney Disease Epidemiology Collaboration (CKD-EPI) equation refit without adjustment for race.       Narrative:      GFR Normal >60  Chronic Kidney Disease <60  Kidney Failure <15      BNP [[id number]]  (Normal) Collected: 01/12/23 0746    Specimen: Blood Updated: 01/12/23 0825     proBNP 77.6 pg/mL     Narrative:      Among patients with dyspnea, NT-proBNP is highly sensitive for the detection of acute congestive heart failure. In addition NT-proBNP of <300 pg/ml effectively rules out acute congestive heart failure with 99% negative predictive value.      Troponin [[id number]]  (Normal) Collected: 01/12/23 0746    Specimen: Blood Updated: 01/12/23 0828     Troponin T <0.010 ng/mL     Narrative:      Troponin T Reference Range:  <= 0.03 ng/mL-   Negative for AMI  >0.03 ng/mL-     Abnormal for myocardial necrosis.  Clinicians would have to  utilize clinical acumen, EKG, Troponin and serial changes to determine if it is an Acute Myocardial Infarction or myocardial injury due to an underlying chronic condition.       Results may be falsely decreased if patient taking Biotin.      CBC Auto Differential [[id number]]  (Normal) Collected: 01/12/23 0746    Specimen: Blood Updated: 01/12/23 0801     WBC 4.64 10*3/mm3      RBC 4.63 10*6/mm3      Hemoglobin 13.7 g/dL      Hematocrit 38.8 %      MCV 83.8 fL      MCH 29.6 pg      MCHC 35.3 g/dL      RDW 12.6 %      RDW-SD 38.4 fl      MPV 10.4 fL      Platelets 193 10*3/mm3      Neutrophil % 46.1 %      Lymphocyte % 42.7 %      Monocyte % 8.6 %      Eosinophil % 1.5 %      Basophil % 0.9 %      Immature Grans % 0.2 %      Neutrophils, Absolute 2.14 10*3/mm3      Lymphocytes, Absolute 1.98 10*3/mm3      Monocytes, Absolute 0.40 10*3/mm3      Eosinophils, Absolute 0.07 10*3/mm3      Basophils, Absolute 0.04 10*3/mm3      Immature Grans, Absolute 0.01 10*3/mm3      nRBC 0.0 /100 WBC     Urinalysis With Microscopic If Indicated (No Culture) - Urine, Clean Catch [[id number]]  (Abnormal) Collected: 01/12/23 1125    Specimen: Urine, Clean Catch Updated: 01/12/23 1138     Color, UA Yellow     Appearance, UA Clear     pH, UA 7.0     Specific Gravity, UA <=1.005     Glucose, UA Negative     Ketones, UA Negative     Bilirubin, UA Negative     Blood, UA Negative     Protein, UA Negative     Leuk Esterase, UA Small (1+)     Nitrite, UA Negative     Urobilinogen, UA 0.2 E.U./dL    Urinalysis, Microscopic Only - Urine, Clean Catch [[id number]]  (Abnormal) Collected: 01/12/23 1125    Specimen: Urine, Clean Catch Updated: 01/12/23 1138     RBC, UA 3-5 /HPF      WBC, UA 3-5 /HPF      Bacteria, UA None Seen /HPF      Squamous Epithelial Cells, UA 0-2 /HPF      Hyaline Casts, UA None Seen /LPF      Methodology Automated Microscopy           Imaging:    XR Chest 1 View    Result Date: 1/12/2023  PROCEDURE: XR CHEST 1 VW   COMPARISON: [facility], CR, CHEST AP/PA 1 VIEW, 1/16/2021, 14:42.  INDICATIONS: SOA Triage Protocol  FINDINGS:  There is no pneumothorax, pleural effusion or focal airspace consolidation. The heart size and pulmonary vasculature appear within normal limits. There are no acute osseous abnormalities.       No acute cardiopulmonary abnormality.       CHERY BILL MD       Electronically Signed and Approved By: CHERY BILL MD on 1/12/2023 at 8:07                 Differential Diagnosis and Discussion:    Chest Pain:  Based on the patient's signs and symptoms, I considered aortic dissection, myocardial infaction, pulmonary embolism, cardiac tamponade, pericarditis, pneumothorax, musculoskeletal chest pain and other differential diagnosis as an etiology of the patient's chest pain.     All labs were reviewed and analyzed by me.  All X-rays were independently reviewed by me.  EKG was interpreted by me.    MDM  Number of Diagnoses or Management Options  Anxiety  Diagnosis management comments: The patient had an EKG that shows no acute changes. Specifically, there are no ST elevations, t-wave changes of concern, delta waves, or rhythm abnormalities warranting admission. The patient was placed on the cardiac monitor and observed with continuous telemetry. The patient has a chest x-ray interpreted by me that is negative for pneumothorax, pneumonia, and is essentially unremarkable. The patient has had unelevated troponins on blood draw.      The patient is resting comfortably and feels better, is alert and in no distress.  The patient´s CBC that was reviewed and interpreted by me shows no abnormalities of critical concern. Of note, there is no anemia requiring a blood transfusion and the platelet count is acceptable.  The patient´s CMP that was reviewed and interpretted by me shows no abnormalities of critical concern. Of note, the patient´s sodium and potassium are acceptable. The patient´s liver enzymes are  unremarkable. The patient´s renal function (creatinine) is preserved. The patient has a normal anion gap.  Troponin is negative.  Urinalysis is negative for bacteriuria.  The patient does not have hypertension, diabetes, hypercholesterolemia or other risk factors concerning for coronary artery disease.  The repeat examination is unremarkable and benign. Electrocardiogram shows no signs of acute ischemia and the history, exam, diagnostic testing and current condition did not suggest that this patient is having an acute myocardial infarction, significant arrhythmia, unstable angina, esophageal perforation, pulmonary embolism, aortic dissection, severe pneumonia, sepsis for other significant pathology that would warrant further testing, continued ED treatment, admission, cardiology or other specialist consultation at this point. The vital signs have been stable. The patient's condition is stable and appropriate for discharge. The patient will pursue further outpatient evaluation with the primary care physician, or designated physician or cardiologist. The patient has expressed a clear and thorough understanding and agreed to follow-up as instructed.       Amount and/or Complexity of Data Reviewed  Clinical lab tests: reviewed  Tests in the radiology section of CPT®: reviewed  Tests in the medicine section of CPT®: reviewed  Independent visualization of images, tracings, or specimens: yes    Risk of Complications, Morbidity, and/or Mortality  Presenting problems: moderate  Management options: moderate    Patient Progress  Patient progress: stable (12:24 EST Updated patient on results and plan. Patient expressed understanding and agreement. All questions answered at this time.   12:24 EST Updated patient on results and plan for discharge. Patient expressed understanding and agreement. All questions answered at this time.  )           Patient Care Considerations:    PERC: I used the PERC score to risk stratify the patient  for PE and a CT of the chest was considered but ultimately not indicated in today's visit.      Consultants/Shared Management Plan:    None    Social Determinants of Health:    Patient is independent, reliable, and has access to care.       Disposition and Care Coordination:    Discharged: I considered escalation of care by admitting this patient for observation, however the patient has improved and is suitable and  stable for discharge.    I have explained the patient´s condition, diagnoses and treatment plan based on the information available to me at this time. I have answered questions and addressed any concerns. The patient has a good  understanding of the patient´s diagnosis, condition, and treatment plan as can be expected at this point. The vital signs have been stable. The patient´s condition is stable and appropriate for discharge from the emergency department.      The patient will pursue further outpatient evaluation with the primary care physician or other designated or consulting physician as outlined in the discharge instructions. They are agreeable to this plan of care and follow-up instructions have been explained in detail. The patient has received these instructions in written format and have expressed an understanding of the discharge instructions. The patient is aware that any significant change in condition or worsening of symptoms should prompt an immediate return to this or the closest emergency department or call to 911.  I have explained discharge medications and the need for follow up with the patient/caretakers. This was also printed in the discharge instructions. Patient was discharged with the following medications and follow up:      Medication List      No changes were made to your prescriptions during this visit.      Fred Galvez MD  [address] [place] [id number] [phone]             Final diagnoses:   Anxiety        ED Disposition     ED Disposition    Discharge    Condition   Stable    Comment   --             This medical record created using voice recognition software.        Documentation assistance provided by Verónica Wade acting as scribe for Romulo Cmaarena MD. Information recorded by the scribe was done at my direction and has been verified and validated by me.          Verónica Wade  01/12/23 0140       Verónica Wade  01/12/23 1218       Verónica Wade  01/12/23 1224       Romulo Camarena MD  01/12/23 1232

## 2023-01-16 ENCOUNTER — TELEPHONE (OUTPATIENT)
Dept: PHYSICAL THERAPY | Facility: CLINIC | Age: 53
End: 2023-01-16

## 2023-01-16 NOTE — TELEPHONE ENCOUNTER
Caller:     Relationship:     PLEASE CALL PATIENT. SHE WANTS TO ASK YOUR OPINION ON SOMETHING AND SOMEONE YOU COULD REFER HER TO   81

## 2023-01-22 ENCOUNTER — HOSPITAL ENCOUNTER (EMERGENCY)
Facility: HOSPITAL | Age: 53
Discharge: HOME | End: 2023-01-22
Attending: EMERGENCY MEDICINE | Admitting: EMERGENCY MEDICINE
Payer: COMMERCIAL

## 2023-01-22 ENCOUNTER — APPOINTMENT (OUTPATIENT)
Dept: CT IMAGING | Facility: HOSPITAL | Age: 53
End: 2023-01-22
Payer: COMMERCIAL

## 2023-01-22 ENCOUNTER — APPOINTMENT (OUTPATIENT)
Dept: GENERAL RADIOLOGY | Facility: HOSPITAL | Age: 53
End: 2023-01-22
Payer: COMMERCIAL

## 2023-01-22 VITALS
RESPIRATION RATE: 18 BRPM | HEART RATE: 62 BPM | BODY MASS INDEX: 21.9 KG/M2 | TEMPERATURE: 97.7 F | SYSTOLIC BLOOD PRESSURE: 107 MMHG | DIASTOLIC BLOOD PRESSURE: 64 MMHG | HEIGHT: 65 IN | OXYGEN SATURATION: 98 %

## 2023-01-22 DIAGNOSIS — M54.9 BACK PAIN, UNSPECIFIED BACK LOCATION, UNSPECIFIED BACK PAIN LATERALITY, UNSPECIFIED CHRONICITY: Primary | ICD-10-CM

## 2023-01-22 LAB
ALBUMIN SERPL-MCNC: 4.4 G/DL (ref 3.5–5.2)
ALBUMIN/GLOB SERPL: 1.9 G/DL
ALP SERPL-CCNC: 88 U/L (ref 39–117)
ALT SERPL W P-5'-P-CCNC: 16 U/L (ref 1–33)
ANION GAP SERPL CALCULATED.3IONS-SCNC: 11.2 MMOL/L (ref 5–15)
AST SERPL-CCNC: 19 U/L (ref 1–32)
BASOPHILS # BLD AUTO: 0.02 10*3/MM3 (ref 0–0.2)
BASOPHILS NFR BLD AUTO: 0.6 % (ref 0–1.5)
BILIRUB SERPL-MCNC: 0.5 MG/DL (ref 0–1.2)
BUN SERPL-MCNC: 10 MG/DL (ref 6–20)
BUN/CREAT SERPL: 14.5 (ref 7–25)
CALCIUM SPEC-SCNC: 9.4 MG/DL (ref 8.6–10.5)
CHLORIDE SERPL-SCNC: 102 MMOL/L (ref 98–107)
CO2 SERPL-SCNC: 24.8 MMOL/L (ref 22–29)
CREAT SERPL-MCNC: 0.69 MG/DL (ref 0.57–1)
DEPRECATED RDW RBC AUTO: 35.7 FL (ref 37–54)
EGFRCR SERPLBLD CKD-EPI 2021: 103.3 ML/MIN/1.73
EOSINOPHIL # BLD AUTO: 0.03 10*3/MM3 (ref 0–0.4)
EOSINOPHIL NFR BLD AUTO: 0.8 % (ref 0.3–6.2)
ERYTHROCYTE [DISTWIDTH] IN BLOOD BY AUTOMATED COUNT: 11.9 % (ref 12.3–15.4)
GLOBULIN UR ELPH-MCNC: 2.3 GM/DL
GLUCOSE SERPL-MCNC: 96 MG/DL (ref 65–99)
HCT VFR BLD AUTO: 34.7 % (ref 34–46.6)
HGB BLD-MCNC: 12.6 G/DL (ref 12–15.9)
HOLD SPECIMEN: NORMAL
HOLD SPECIMEN: NORMAL
IMM GRANULOCYTES # BLD AUTO: 0.01 10*3/MM3 (ref 0–0.05)
IMM GRANULOCYTES NFR BLD AUTO: 0.3 % (ref 0–0.5)
LYMPHOCYTES # BLD AUTO: 1.46 10*3/MM3 (ref 0.7–3.1)
LYMPHOCYTES NFR BLD AUTO: 40.3 % (ref 19.6–45.3)
MCH RBC QN AUTO: 29.7 PG (ref 26.6–33)
MCHC RBC AUTO-ENTMCNC: 36.3 G/DL (ref 31.5–35.7)
MCV RBC AUTO: 81.8 FL (ref 79–97)
MONOCYTES # BLD AUTO: 0.18 10*3/MM3 (ref 0.1–0.9)
MONOCYTES NFR BLD AUTO: 5 % (ref 5–12)
NEUTROPHILS NFR BLD AUTO: 1.92 10*3/MM3 (ref 1.7–7)
NEUTROPHILS NFR BLD AUTO: 53 % (ref 42.7–76)
NRBC BLD AUTO-RTO: 0 /100 WBC (ref 0–0.2)
NT-PROBNP SERPL-MCNC: 259.5 PG/ML (ref 0–900)
PLATELET # BLD AUTO: 205 10*3/MM3 (ref 140–450)
PMV BLD AUTO: 10.6 FL (ref 6–12)
POTASSIUM SERPL-SCNC: 3.9 MMOL/L (ref 3.5–5.2)
PROT SERPL-MCNC: 6.7 G/DL (ref 6–8.5)
RBC # BLD AUTO: 4.24 10*6/MM3 (ref 3.77–5.28)
SODIUM SERPL-SCNC: 138 MMOL/L (ref 136–145)
TROPONIN T SERPL-MCNC: <0.01 NG/ML (ref 0–0.03)
WBC NRBC COR # BLD: 3.62 10*3/MM3 (ref 3.4–10.8)
WHOLE BLOOD HOLD COAG: NORMAL
WHOLE BLOOD HOLD SPECIMEN: NORMAL

## 2023-01-22 PROCEDURE — 96374 THER/PROPH/DIAG INJ IV PUSH: CPT

## 2023-01-22 PROCEDURE — 84484 ASSAY OF TROPONIN QUANT: CPT | Performed by: EMERGENCY MEDICINE

## 2023-01-22 PROCEDURE — 93005 ELECTROCARDIOGRAM TRACING: CPT | Performed by: EMERGENCY MEDICINE

## 2023-01-22 PROCEDURE — 71260 CT THORAX DX C+: CPT

## 2023-01-22 PROCEDURE — 99283 EMERGENCY DEPT VISIT LOW MDM: CPT

## 2023-01-22 PROCEDURE — 93010 ELECTROCARDIOGRAM REPORT: CPT | Performed by: INTERNAL MEDICINE

## 2023-01-22 PROCEDURE — 0 IOPAMIDOL PER 1 ML: Performed by: EMERGENCY MEDICINE

## 2023-01-22 PROCEDURE — 36415 COLL VENOUS BLD VENIPUNCTURE: CPT

## 2023-01-22 PROCEDURE — 83880 ASSAY OF NATRIURETIC PEPTIDE: CPT | Performed by: EMERGENCY MEDICINE

## 2023-01-22 PROCEDURE — 25010000002 KETOROLAC TROMETHAMINE PER 15 MG: Performed by: EMERGENCY MEDICINE

## 2023-01-22 PROCEDURE — 93005 ELECTROCARDIOGRAM TRACING: CPT

## 2023-01-22 PROCEDURE — 80053 COMPREHEN METABOLIC PANEL: CPT | Performed by: EMERGENCY MEDICINE

## 2023-01-22 PROCEDURE — 85025 COMPLETE CBC W/AUTO DIFF WBC: CPT | Performed by: EMERGENCY MEDICINE

## 2023-01-22 RX ORDER — ORPHENADRINE CITRATE 100 MG/1
100 TABLET, EXTENDED RELEASE ORAL 2 TIMES DAILY
Qty: 16 TABLET | Refills: 0 | Status: SHIPPED | OUTPATIENT
Start: 2023-01-22

## 2023-01-22 RX ORDER — KETOROLAC TROMETHAMINE 10 MG/1
10 TABLET, FILM COATED ORAL EVERY 6 HOURS PRN
Qty: 15 TABLET | Refills: 0 | Status: SHIPPED | OUTPATIENT
Start: 2023-01-22

## 2023-01-22 RX ORDER — ORPHENADRINE CITRATE 30 MG/ML
60 INJECTION INTRAMUSCULAR; INTRAVENOUS ONCE
Status: DISCONTINUED | OUTPATIENT
Start: 2023-01-22 | End: 2023-01-22 | Stop reason: HOSPADM

## 2023-01-22 RX ORDER — SODIUM CHLORIDE 0.9 % (FLUSH) 0.9 %
10 SYRINGE (ML) INJECTION AS NEEDED
Status: DISCONTINUED | OUTPATIENT
Start: 2023-01-22 | End: 2023-01-22 | Stop reason: HOSPADM

## 2023-01-22 RX ORDER — KETOROLAC TROMETHAMINE 30 MG/ML
30 INJECTION, SOLUTION INTRAMUSCULAR; INTRAVENOUS ONCE
Status: COMPLETED | OUTPATIENT
Start: 2023-01-22 | End: 2023-01-22

## 2023-01-22 RX ADMIN — IOPAMIDOL 100 ML: 755 INJECTION, SOLUTION INTRAVENOUS at 17:17

## 2023-01-22 RX ADMIN — KETOROLAC TROMETHAMINE 30 MG: 30 INJECTION, SOLUTION INTRAMUSCULAR; INTRAVENOUS at 16:41

## 2023-01-22 NOTE — ED NOTES
Pt discharged home, she ambulated out of ED with spouse MD answered her questions. Pt reports feeling better and independently ambulated out of ED.

## 2023-01-22 NOTE — ED PROVIDER NOTES
Time: 2:09 PM EST  Date of encounter:  1/22/2023  Independent Historian/Clinical History and Information was obtained by:   Patient  Chief Complaint: back pain    History is limited by: N/A    History of Present Illness:  Patient is a 52 y.o. year old female who presents to the emergency department for evaluation of left upper back pain with onset this morning. Pt states that she had spasms to her back and that there is numbness/tingling to bilateral upper and lower extremities. Pt had a hx of scoliosis and lower back injury. Pt had near syncopal episode from the pain. Pain is worse with movement.    HPI    Patient Care Team  Primary Care Provider: Fred Galvez MD    Past Medical History:     Allergies   Allergen Reactions   • Amoxicillin-Pot Clavulanate Hives   • Flonase [Fluticasone] Swelling   • Latex Hives   • Metronidazole Hives   • Paxil [Paroxetine] Hives   • Prednisone Anxiety     All oral and IM steroids   • Wellbutrin [Bupropion] Anxiety     Past Medical History:   Diagnosis Date   • Anxiety    • Breast lump    • De Quervain's disease (tenosynovitis) 02/28/2018    Right   • Depression    • Herpes    • IBS (irritable bowel syndrome)    • PVC (premature ventricular contraction)    • Seasonal allergies      Past Surgical History:   Procedure Laterality Date   • ARTHROPLASTY      Surgical Clips   • BREAST SURGERY     • COLONOSCOPY  07/2016   • FOOT SURGERY Left     surgical repair 8/2021     Family History   Problem Relation Age of Onset   • Cancer Mother         Unspecified   • Osteoporosis Mother    • Breast cancer Other    • Diabetes Son         Unspecified type       Home Medications:  Prior to Admission medications    Medication Sig Start Date End Date Taking? Authorizing Provider   ALPRAZolam (XANAX) 0.5 MG tablet alprazolam 0.5 mg tablet    Provider, MD Jeffry   calcium citrate-vitamin d (CALCITRATE) 315-250 MG-UNIT tablet tablet calcium citrate 315 mg calcium-vitamin D3 6.25 mcg (250 unit)  "tablet    ProviderJeffry MD   ibuprofen (ADVIL,MOTRIN) 800 MG tablet ibuprofen 800 mg tablet   TAKE 1 TABLET BY MOUTH EVERY 8 HOURS AS NEEDED    ProviderJeffry MD   LORazepam (ATIVAN) 1 MG tablet Take 1 tablet by mouth Every 8 (Eight) Hours As Needed for Anxiety. 1/12/23   Romulo Camarena MD   saccharomyces boulardii (FLORASTOR) 250 MG capsule Take 250 mg by mouth 2 (Two) Times a Day.    ProviderJeffry MD   valACYclovir (VALTREX) 1000 MG tablet Take 1 tablet by mouth Daily. 7/19/22   Naomie Bellamy MD   zinc sulfate (ZINCATE) 220 (50 Zn) MG capsule Take 220 mg by mouth Daily.    ProviderJeffry MD        Social History:   Social History     Tobacco Use   • Smoking status: Former   • Smokeless tobacco: Never   Vaping Use   • Vaping Use: Never used   Substance Use Topics   • Alcohol use: Yes     Comment: occ   • Drug use: Never         Review of Systems:  Review of Systems   Constitutional: Negative for chills and fever.   HENT: Negative for congestion, rhinorrhea and sore throat.    Eyes: Negative for pain and visual disturbance.   Respiratory: Negative for apnea, cough, chest tightness and shortness of breath.    Cardiovascular: Negative for chest pain and palpitations.   Gastrointestinal: Negative for abdominal pain, diarrhea, nausea and vomiting.   Genitourinary: Negative for difficulty urinating and dysuria.   Musculoskeletal: Positive for back pain. Negative for joint swelling and myalgias.   Skin: Negative for color change.   Neurological: Positive for numbness. Negative for seizures and headaches.   Psychiatric/Behavioral: Negative.    All other systems reviewed and are negative.       Physical Exam:  /64   Pulse 62   Temp 97.7 °F (36.5 °C) (Oral)   Resp 18   Ht 165.1 cm (65\")   SpO2 98%   BMI 21.90 kg/m²     Physical Exam  Vitals and nursing note reviewed.   Constitutional:       General: She is not in acute distress.     Appearance: Normal appearance. She is not " toxic-appearing.   HENT:      Head: Normocephalic and atraumatic.      Jaw: There is normal jaw occlusion.   Eyes:      General: Lids are normal.      Extraocular Movements: Extraocular movements intact.      Conjunctiva/sclera: Conjunctivae normal.      Pupils: Pupils are equal, round, and reactive to light.   Cardiovascular:      Rate and Rhythm: Normal rate and regular rhythm.      Pulses: Normal pulses.      Heart sounds: Normal heart sounds.   Pulmonary:      Effort: Pulmonary effort is normal. No respiratory distress.      Breath sounds: Normal breath sounds. No wheezing or rhonchi.   Abdominal:      General: Abdomen is flat.      Palpations: Abdomen is soft.      Tenderness: There is no abdominal tenderness. There is no guarding or rebound.   Musculoskeletal:         General: Normal range of motion.      Left shoulder: Tenderness present.      Cervical back: Normal range of motion and neck supple.      Right lower leg: No edema.      Left lower leg: No edema.   Skin:     General: Skin is warm and dry.   Neurological:      Mental Status: She is alert and oriented to person, place, and time. Mental status is at baseline.   Psychiatric:         Mood and Affect: Mood normal.                  Procedures:  Procedures      Medical Decision Making:      Comorbidities that affect care:    None    External Notes reviewed:    Previous Clinic Note and Previous ED Note      The following orders were placed and all results were independently analyzed by me:  Orders Placed This Encounter   Procedures   • CT Chest With Contrast Diagnostic   • [place] Draw   • Comprehensive Metabolic Panel   • BNP   • Troponin   • CBC Auto Differential   • NPO Diet NPO Type: Strict NPO   • Undress & Gown   • Cardiac Monitoring   • Continuous Pulse Oximetry   • Vital Signs   • Oxygen Therapy- Nasal Cannula; 2 LPM; Titrate for SPO2: equal to or greater than, 92%   • ECG 12 Lead ED Triage Standing Order; SOA   • Insert Peripheral IV   • CBC &  Differential   • Green Top (Gel)   • Lavender Top   • Gold Top - SST   • Light Blue Top       Medications Given in the Emergency Department:  Medications   sodium chloride 0.9 % flush 10 mL (has no administration in time range)   orphenadrine (NORFLEX) injection 60 mg (60 mg Intravenous Not Given 1/22/23 1648)   ketorolac (TORADOL) injection 30 mg (30 mg Intravenous Given 1/22/23 1641)   iopamidol (ISOVUE-370) 76 % injection 100 mL (100 mL Intravenous Given 1/22/23 1717)        ED Course:    ED Course as of 01/22/23 1809   Sun Jan 22, 2023   1806 EKG:    Rhythm: sinus  Rate: 55  Axis: normal  Intervals: normal  ST Segment: no elevations    EKG Comparison: unchanged    Interpreted by me   [BN]      ED Course User Index  [BN] Romulo Camarena MD       Labs:    Lab Results (last 24 hours)     Procedure Component Value Units Date/Time    CBC & Differential [[id number]]  (Abnormal) Collected: 01/22/23 1343    Specimen: Blood from Arm, Right Updated: 01/22/23 1405    Narrative:      The following orders were created for panel order CBC & Differential.  Procedure                               Abnormality         Status                     ---------                               -----------         ------                     CBC Auto Differential[[id number]]        Abnormal            Final result                 Please view results for these tests on the individual orders.    Comprehensive Metabolic Panel [[id number]] Collected: 01/22/23 1343    Specimen: Blood from Arm, Right Updated: 01/22/23 1432     Glucose 96 mg/dL      BUN 10 mg/dL      Creatinine 0.69 mg/dL      Sodium 138 mmol/L      Potassium 3.9 mmol/L      Chloride 102 mmol/L      CO2 24.8 mmol/L      Calcium 9.4 mg/dL      Total Protein 6.7 g/dL      Albumin 4.4 g/dL      ALT (SGPT) 16 U/L      AST (SGOT) 19 U/L      Alkaline Phosphatase 88 U/L      Total Bilirubin 0.5 mg/dL      Globulin 2.3 gm/dL      A/G Ratio 1.9 g/dL      BUN/Creatinine Ratio 14.5      Anion Gap 11.2 mmol/L      eGFR 103.3 mL/min/1.73     Narrative:      GFR Normal >60  Chronic Kidney Disease <60  Kidney Failure <15      BNP [[id number]]  (Normal) Collected: 01/22/23 1343    Specimen: Blood from Arm, Right Updated: 01/22/23 1426     proBNP 259.5 pg/mL     Narrative:      Among patients with dyspnea, NT-proBNP is highly sensitive for the detection of acute congestive heart failure. In addition NT-proBNP of <300 pg/ml effectively rules out acute congestive heart failure with 99% negative predictive value.      Troponin [[id number]]  (Normal) Collected: 01/22/23 1343    Specimen: Blood from Arm, Right Updated: 01/22/23 1432     Troponin T <0.010 ng/mL     Narrative:      Troponin T Reference Range:  <= 0.03 ng/mL-   Negative for AMI  >0.03 ng/mL-     Abnormal for myocardial necrosis.  Clinicians would have to utilize clinical acumen, EKG, Troponin and serial changes to determine if it is an Acute Myocardial Infarction or myocardial injury due to an underlying chronic condition.       Results may be falsely decreased if patient taking Biotin.      CBC Auto Differential [[id number]]  (Abnormal) Collected: 01/22/23 1343    Specimen: Blood from Arm, Right Updated: 01/22/23 1405     WBC 3.62 10*3/mm3      RBC 4.24 10*6/mm3      Hemoglobin 12.6 g/dL      Hematocrit 34.7 %      MCV 81.8 fL      MCH 29.7 pg      MCHC 36.3 g/dL      RDW 11.9 %      RDW-SD 35.7 fl      MPV 10.6 fL      Platelets 205 10*3/mm3      Neutrophil % 53.0 %      Lymphocyte % 40.3 %      Monocyte % 5.0 %      Eosinophil % 0.8 %      Basophil % 0.6 %      Immature Grans % 0.3 %      Neutrophils, Absolute 1.92 10*3/mm3      Lymphocytes, Absolute 1.46 10*3/mm3      Monocytes, Absolute 0.18 10*3/mm3      Eosinophils, Absolute 0.03 10*3/mm3      Basophils, Absolute 0.02 10*3/mm3      Immature Grans, Absolute 0.01 10*3/mm3      nRBC 0.0 /100 WBC            Imaging:    CT Chest With Contrast Diagnostic    Result Date: 1/22/2023  PROCEDURE: CT  CHEST W CONTRAST DIAGNOSTIC  COMPARISON:  None INDICATIONS: shortness of breath  TECHNIQUE: After obtaining the patient's consent, CT images were obtained with non-ionic intravenous contrast material.   PROTOCOL:   Pulmonary embolism imaging protocol performed    RADIATION:   DLP: 254.6 mGy*cm   Automated exposure control was utilized to minimize radiation dose. CONTRAST: 75 cc Isovue 370 I.V.  FINDINGS:  Pulmonary arteries:  Adequately opacified.  No emboli demonstrated  Elizabeth/mediastinum:  Thoracic aorta normal in caliber.  No coronary calcification.  No pericardial effusion.  No adenopathy  Lungs/pleura:  No acute pulmonary abnormality.  Calcified granulomas bilaterally.  4 mm well-circumscribed noncalcified nodule in the left lower lobe (119).  No pleural effusion  Upper abdomen:  Unremarkable  Bones/soft tissues:  No acute bony abnormality         1. No evidence of pulmonary embolism.  No acute cardiopulmonary process demonstrated  2. 4 mm noncalcified nodule in the left lower lobe, likely a noncalcified granuloma.  If the patient is high risk, then a follow-up chest CT in 12 months may be considered     KATHI MELÉNDEZ MD       Electronically Signed and Approved By: KATHI MELÉNDEZ MD on 1/22/2023 at 17:40                 Differential Diagnosis and Discussion:    Back Pain: The patient presents with back pain. My differential diagnosis includes but is not limited to acute spinal epidural abscess, acute spinal epidural bleed, cauda equina syndrome, abdominal aortic aneurysm, aortic dissection, kidney stone, pyelonephritis, musculoskeletal back pain, spinal fracture, and osteoarthritis.     All labs were reviewed and analyzed by me.    MDM  Number of Diagnoses or Management Options  Back pain, unspecified back location, unspecified back pain laterality, unspecified chronicity  Diagnosis management comments: The patient´s symptoms are consistent with musculoskeletal back pain.  The patient´s CBC that was reviewed  and interpreted by me shows no abnormalities of critical concern. Of note, there is no anemia requiring a blood transfusion and the platelet count is acceptable.  The patient´s CMP that was reviewed and interpretted by me shows no abnormalities of critical concern. Of note, the patient´s sodium and potassium are acceptable. The patient´s liver enzymes are unremarkable. The patient´s renal function (creatinine) is preserved. The patient has a normal anion gap.  Troponin is negative.  BNP is 259.  CT scan of the chest does show a 4 mm nodule.  I did relay this to the patient and advised him to follow-up for repeat CTs.  The patient is now resting comfortably, feels better, is alert, talkative, interactive and in no distress. The repeat examination is unremarkable and benign. The patient is neurologically intact and is ambulatory in the ED. The patient has no fever, no bowel or bladder incontinence, no saddle anesthesia, and is otherwise alert and well appearing. The history, physical exam, and diagnostics (if any) do not suggest the presence of acute spinal epidural abscess, acute spinal epidural bleed, cauda equina syndrome, abdominal aortic aneurysm, aortic dissection or other process requiring further testing, treatment or consultation in the emergency department. The vital signs have been stable. The patient's condition is stable and appropriate for discharge. The patient will pursue further outpatient evaluation with the primary care physician or other designated for consulting position as indicated in the discharge instructions.       Amount and/or Complexity of Data Reviewed  Clinical lab tests: reviewed  Tests in the radiology section of CPT®: reviewed  Tests in the medicine section of CPT®: reviewed  Independent visualization of images, tracings, or specimens: yes    Risk of Complications, Morbidity, and/or Mortality  Presenting problems: moderate  Management options: moderate    Patient Progress  Patient  progress: stable           Consultants/Shared Management Plan:    None       Patient Care Considerations:    NARCOTICS: I considered prescribing opiate pain medication as an outpatient, however patient's pain was controlled with anti-inflammatories.      Social Determinants of Health:    Patient is independent, reliable, and has access to care.       Disposition and Care Coordination:    Discharged: I considered escalation of care by admitting this patient for observation, however the patient has improved and is suitable and  stable for discharge.    I have explained the patient´s condition, diagnoses and treatment plan based on the information available to me at this time. I have answered questions and addressed any concerns. The patient has a good  understanding of the patient´s diagnosis, condition, and treatment plan as can be expected at this point. The vital signs have been stable. The patient´s condition is stable and appropriate for discharge from the emergency department.      The patient will pursue further outpatient evaluation with the primary care physician or other designated or consulting physician as outlined in the discharge instructions. They are agreeable to this plan of care and follow-up instructions have been explained in detail. The patient has received these instructions in written format and have expressed an understanding of the discharge instructions. The patient is aware that any significant change in condition or worsening of symptoms should prompt an immediate return to this or the closest emergency department or call to 911.  I have explained discharge medications and the need for follow up with the patient/caretakers. This was also printed in the discharge instructions. Patient was discharged with the following medications and follow up:      Medication List      New Prescriptions    ketorolac 10 MG tablet  Commonly known as: TORADOL  Take 1 tablet by mouth Every 6 (Six) Hours As  Needed for Moderate Pain.     orphenadrine 100 MG 12 hr tablet  Commonly known as: NORFLEX  Take 1 tablet by mouth 2 (Two) Times a Day.           Where to Get Your Medications      These medications were sent to [id number] - [place], [place] - [address] AT [facility] - [phone]  - [phone] FX  [address], [place] [address]    Phone: [phone]   · ketorolac 10 MG tablet  · orphenadrine 100 MG 12 hr tablet      Fred Galvez MD  [address] [place], [place] [id number] [phone]    In 2 days         Final diagnoses:   Back pain, unspecified back location, unspecified back pain laterality, unspecified chronicity        ED Disposition     ED Disposition   Discharge    Condition   Stable    Comment   --             This medical record created using voice recognition software.             Kyrie Reaves  01/22/23 1414       Kyrie Reaves  01/22/23 5151       Romulo Camarena MD  01/22/23 9270

## 2023-01-30 LAB — QT INTERVAL: 454 MS

## 2023-05-31 ENCOUNTER — TRANSCRIBE ORDERS (OUTPATIENT)
Dept: ADMINISTRATIVE | Facility: HOSPITAL | Age: 53
End: 2023-05-31

## 2023-05-31 DIAGNOSIS — S46.092A OTHER INJURY OF MUSCLE(S) AND TENDON(S) OF THE ROTATOR CUFF OF LEFT SHOULDER, INITIAL ENCOUNTER: Primary | ICD-10-CM

## 2023-06-01 ENCOUNTER — HOSPITAL ENCOUNTER (OUTPATIENT)
Dept: MRI IMAGING | Facility: HOSPITAL | Age: 53
Discharge: HOME | End: 2023-06-01
Admitting: FAMILY MEDICINE

## 2023-06-01 DIAGNOSIS — S46.092A OTHER INJURY OF MUSCLE(S) AND TENDON(S) OF THE ROTATOR CUFF OF LEFT SHOULDER, INITIAL ENCOUNTER: ICD-10-CM

## 2023-06-01 PROCEDURE — 73221 MRI JOINT UPR EXTREM W/O DYE: CPT

## 2023-07-25 ENCOUNTER — TELEPHONE (OUTPATIENT)
Dept: OBSTETRICS AND GYNECOLOGY | Facility: CLINIC | Age: 53
End: 2023-07-25

## 2023-08-18 ENCOUNTER — TRANSCRIBE ORDERS (OUTPATIENT)
Dept: PHYSICAL THERAPY | Facility: CLINIC | Age: 53
End: 2023-08-18
Payer: COMMERCIAL

## 2023-08-18 DIAGNOSIS — S99.192D CLOSED FRACTURE OF BASE OF FIFTH METATARSAL BONE OF LEFT FOOT AT METAPHYSEAL-DIAPHYSEAL JUNCTION WITH ROUTINE HEALING, SUBSEQUENT ENCOUNTER: Primary | ICD-10-CM

## 2023-09-06 ENCOUNTER — TREATMENT (OUTPATIENT)
Dept: PHYSICAL THERAPY | Facility: CLINIC | Age: 53
End: 2023-09-06
Payer: COMMERCIAL

## 2023-09-06 DIAGNOSIS — M25.512 LEFT SHOULDER PAIN, UNSPECIFIED CHRONICITY: Primary | ICD-10-CM

## 2023-09-06 DIAGNOSIS — M25.612 DECREASED RANGE OF MOTION OF LEFT SHOULDER: ICD-10-CM

## 2023-09-06 DIAGNOSIS — M25.812 IMPINGEMENT OF LEFT SHOULDER: ICD-10-CM

## 2023-09-06 DIAGNOSIS — R29.898 LEFT ARM WEAKNESS: ICD-10-CM

## 2023-09-06 NOTE — PROGRESS NOTES
"  Physical Therapy Initial Evaluation and Plan of Care                    [facility] [address] [place] [address]    Patient: Naima [place]   : 1970  Diagnosis/ICD-10 Code:  Left shoulder pain, unspecified chronicity [M25.512]  Referring practitioner: MEGHNA New  Date of Initial Visit: 2023  Today's Date: 2023  Patient seen for 1 sessions           Subjective Questionnaire: QuickDASH: 40. (65.91% disability)        Subjective Evaluation    History of Present Illness  Mechanism of injury: Pt presents to therapy with L shoulder pain. This started last year. [place] She a lot of heavy luggage that she had to lift. She thinks this is when the initial injury started. When she got home, she lifted her granddaughter with her L arm and she thinks that exacerbated it.     Pt received an MRI on 23. It revealed   1. Evidence of partial thickness undersurface tear involving the middle fibers of the supraspinatus component of the cuff.  No definitive full-thickness tear is seen.  2. No evidence for biceps tendon tear or distal retraction.  3. Evidence for a slap type 3 tear.    When pt tries to put deodorant on or when she puts a shirt on over her head, it hurts. Pt would like to go back to \"routine functioning.\"    Pain  Current pain rating: 3  At best pain ratin  At worst pain ratin    Patient Goals  Patient goals for therapy: decreased pain, increased motion, return to sport/leisure activities, independence with ADLs/IADLs and increased strength       Objective          Neurological Testing     Additional Neurological Details  Light touch sensation intact and equal in dermatomes C3-T1    Active Range of Motion   Left Shoulder   Flexion: 110 degrees   Abduction: 88 degrees with pain  External rotation 90°: 42 degrees with pain  Internal rotation 90°: 30 degrees with pain    Right Shoulder   Flexion: 180 degrees   Abduction: 180 degrees "   External rotation 90°: 90 degrees  Internal rotation 90°: 90 degrees     Passive Range of Motion   Left Shoulder   Flexion: 120 degrees with pain  Abduction: 80 degrees with pain  External rotation 45°: 50 degrees with pain  Internal rotation 45°: 50 degrees with pain    Additional Passive Range of Motion Details  Pt feels pain in deltoid and bicep region (radiating from supraspinatus tendon insertion) all the way to elbow with passive IR and ER, ABD , and FLX to end range.            Strength/Myotome Testing     Left Shoulder     Planes of Motion   Flexion: 3- (4/5 in range)   Abduction: 3- (empty in range)   External rotation at 0°: 4- (pain in bicep)   Internal rotation at 0°: 4 (pain in bicep)     Right Shoulder     Planes of Motion   Flexion: 5   Abduction: 5   External rotation at 0°: 5   Internal rotation at 0°: 5     See Exercise, Manual, and Modality Logs for complete treatment.     Assessment & Plan       Assessment  Impairments: abnormal muscle firing, abnormal muscle tone, abnormal or restricted ROM, activity intolerance, impaired physical strength, lacks appropriate home exercise program, pain with function and safety issue   Functional limitations: carrying objects, lifting, pulling, pushing, reaching behind back, reaching overhead and unable to perform repetitive tasks   Assessment details: Pt presents to therapy with L shoulder pain due to partial supraspinatus tear and a SLAP tear. Pt has associated shoulder weakness, decreased range of motion, and functional deficits (QuickDASH). Pt will benefit from skilled therapy in order to strengthen the scapular stabilizers to improve L shoulder strength and ROM so that she can return to her PLOF with functional daily activities.   Prognosis: good    Goals  Plan Goals: SHOULDER  PROBLEMS:     1. The patient has limited AROM of the L shoulder.    LTG 1: 12 weeks:  The patient will demonstrate 170 degrees of L shoulder flexion, 170 degrees of shoulder  abduction, 80 degrees of shoulder external rotation, and 80 degrees of shoulder internal rotation to allow the patient to reach into upper kitchen cabinets and manipulate clothing behind the back with greater ease.    STATUS:  New   STG 1a: 6 weeks:  The patient will demonstrate 130 degrees of L shoulder flexion, 120 degrees of shoulder abduction, 60 degrees of shoulder external rotation, and 50 degrees of shoulder internal rotation.    STATUS:  New    2. The patient has limited strength of the L shoulder.   LTG 2: 12 weeks:  The patient will demonstrate 4 /5 strength for L shoulder flexion, abduction, external rotation, and internal rotation in order to demonstrate improved shoulder stability.    STATUS:  New   STG 2a: 6 weeks:  The patient will demonstrate 3+ /5 strength for L shoulder flexion, abduction, external rotation, and internal rotation.    STATUS:  New   STG2b:  6 weeks:  The patient will be independent with home exercises.     STATUS:  New     3. The patient complains of pain to the L shoulder.   LTG 3: 12 weeks:  The patient will report a pain rating of 3 /10 or better at its worst in the past week, in order to improve sleep quality and tolerance to performance of activities of daily living.    STATUS:  New   STG 3a: 6 weeks:  The patient will report a pain rating of 7 /10 or better at its worst in the past week.    STATUS:  New    4. Carrying, Moving, and Handling Objects Functional Limitation     LTG 4: 12 weeks:  The patient will demonstrate 20% dysfunction or less on the QuickDASH.    STATUS:  New   STG 4a: 6 weeks:  The patient will demonstrate 50 % dysfunction or less on the QuickDASH.      STATUS:  New     Plan  Therapy options: will be seen for skilled therapy services  Planned modality interventions: cryotherapy, electrical stimulation/Russian stimulation and TENS  Planned therapy interventions: ADL retraining, soft tissue mobilization, strengthening, stretching, therapeutic activities, joint  mobilization, home exercise program, functional ROM exercises, flexibility, body mechanics training, postural training, neuromuscular re-education, manual therapy and motor coordination training  Frequency: 2x week  Duration in weeks: 12  Treatment plan discussed with: patient      Visit Diagnoses:    ICD-10-CM ICD-9-CM   1. Left shoulder pain, unspecified chronicity  M25.512 719.41   2. Impingement of left shoulder  M25.812 719.81   3. Left arm weakness  R29.898 729.89   4. Decreased range of motion of left shoulder  M25.612 719.51       History # of Personal Factors and/or Comorbidities: LOW (0)  Examination of Body System(s): # of elements: LOW (1-2)  Clinical Presentation: STABLE   Clinical Decision Making: LOW       Timed:         Manual Therapy:    0     mins  97359;     Therapeutic Exercise:    8     mins  18115;     Neuromuscular Lei:    0    mins  96096;    Therapeutic Activity:     0     mins  22264;     Gait Trainin     mins  53447;     Ultrasound:     0     mins  82716;    Ionto                               0    mins   12322  Self Care                       0     mins   51624  Canalith Repos    0     mins 08985      Un-Timed:  Electrical Stimulation:    00     mins  06623 ( );  Dry Needling     0     mins self-pay  Traction     0     mins 06147  Low Eval     42     Mins  47042  Mod Eval     0     Mins  92971  High Eval                       0     Mins  02266  Re-Eval                           0    mins  75986    Timed Treatment:   8   mins   Total Treatment:     50   mins    PT SIGNATURE: Sissy Masters PT    Electronically signed 2023    [place] License: PT - [id number]      Initial Certification  Certification Period: 2023 thru 2023  I certify that the therapy services are furnished while this patient is under my care.  The services outlined above are required by this patient, and will be reviewed every 90 days.     PHYSICIAN: Ben Rapp PA  NPI: [id number]      DATE:      Please sign and return via fax to [phone]. Thank you, [facility].

## 2023-09-13 ENCOUNTER — TREATMENT (OUTPATIENT)
Dept: PHYSICAL THERAPY | Facility: CLINIC | Age: 53
End: 2023-09-13
Payer: COMMERCIAL

## 2023-09-13 DIAGNOSIS — R29.898 LEFT ARM WEAKNESS: ICD-10-CM

## 2023-09-13 DIAGNOSIS — M25.512 LEFT SHOULDER PAIN, UNSPECIFIED CHRONICITY: Primary | ICD-10-CM

## 2023-09-13 DIAGNOSIS — M25.612 DECREASED RANGE OF MOTION OF LEFT SHOULDER: ICD-10-CM

## 2023-09-13 DIAGNOSIS — M25.812 IMPINGEMENT OF LEFT SHOULDER: ICD-10-CM

## 2023-09-13 NOTE — PROGRESS NOTES
Physical Therapy Daily Treatment Note                      [facility] [address] [place] [address]    Patient: Naima Perkins   : 1970  Diagnosis/ICD-10 Code:  Left shoulder pain, unspecified chronicity [M25.512]  Referring practitioner: MEGHNA New  Date of Initial Visit: Type: THERAPY  Noted: 2023  Today's Date: 2023  Patient seen for 2 sessions           Subjective   The patient reported that the ER rotation exercises have been really hurting the L shoulder so much so that the pain wakes her up at night. Pt was told to hold off on those exercises from now on. She can tell that her muscles are getting more toned. She really likes most of the exercises.    Objective   See Exercise, Manual, and Modality Logs for complete treatment.     Assessment/Plan  Pt tolerated all exercises and activities well today. Pt unable to get into prone T and Y positions with the arms due to L shoulder pain and decreased AROM. Skilled therapy still required to make improvements in L shoulder strength and ROM so that pt can return to her PLOF with daily functional activities such as reaching into cabinets.        Timed:  Manual Therapy:    0     mins  85653;  Therapeutic Exercise:    15     mins  73545;   body blade   Neuromuscular Lei:   0    mins  08806;    Therapeutic Activity:     11     mins  45266;     Gait Trainin     mins  92778;     Aquatics                         0      mins  37800    Un-timed:  Mechanical Traction      0     mins  56820  Dry Needling     0     mins self-pay  Electrical Stimulation:    0     mins  11106 ( );      Timed Treatment:   26   mins   Total Treatment:     26   mins    Sissy Masters PT    Electronically signed 2023    [place] License: PT - [id number]

## 2023-09-15 ENCOUNTER — TREATMENT (OUTPATIENT)
Dept: PHYSICAL THERAPY | Facility: CLINIC | Age: 53
End: 2023-09-15
Payer: COMMERCIAL

## 2023-09-15 DIAGNOSIS — M25.512 LEFT SHOULDER PAIN, UNSPECIFIED CHRONICITY: Primary | ICD-10-CM

## 2023-09-15 DIAGNOSIS — M25.812 IMPINGEMENT OF LEFT SHOULDER: ICD-10-CM

## 2023-09-15 DIAGNOSIS — M25.612 DECREASED RANGE OF MOTION OF LEFT SHOULDER: ICD-10-CM

## 2023-09-15 DIAGNOSIS — R29.898 LEFT ARM WEAKNESS: ICD-10-CM

## 2023-09-15 NOTE — PROGRESS NOTES
"   Physical Therapy Daily Treatment Note                      [facility] [address] [place] [address]    Patient: Naima Perkins   : 1970  Diagnosis/ICD-10 Code:  Left shoulder pain, unspecified chronicity [M25.512]  Referring practitioner: MEGHNA New  Date of Initial Visit: Type: THERAPY  Noted: 2023  Today's Date: 9/15/2023  Patient seen for 3 sessions           Subjective   The patient reported that yesterday was great. However, the shoulder was really hurting her last night and she could not really get comfortable. Today it has ached all day. All day long today, her L arm has been tender to light touch.     Objective   See Exercise, Manual, and Modality Logs for complete treatment.     Assessment/Plan  Pt had pain initially in the anterior deltoid/supraspinatus tendon region radiating down into biceps with horizontal shoulder abduction. However the more the pt did the exercise, the less it hurt. Pt still complains of pain and a feeling as if sometimes the shoulder needs to \"pop.\" Skilled therapy still required in order to decrease pt's pain and improve shoulder strength/AROM so that she can return to [facility] with daily functional activities. Continue POC.        Timed:  Manual Therapy:    0     mins  00928;  Therapeutic Exercise:    23     mins  57456;     Neuromuscular Lei:   0    mins  83592;    Therapeutic Activity:     8     mins  53751;     Gait Trainin     mins  56375;     Aquatics                         0      mins  68935    Un-timed:  Mechanical Traction      0     mins  03946  Dry Needling     0     mins self-pay  Electrical Stimulation:    0     mins  66796 ( );      Timed Treatment:   31   mins   Total Treatment:     31   mins    Sissy Masters PT    Electronically signed 9/15/2023    [place] License: PT - [id number]                       "

## 2023-09-27 ENCOUNTER — TELEPHONE (OUTPATIENT)
Dept: ORTHOPEDICS | Facility: OTHER | Age: 53
End: 2023-09-27
Payer: COMMERCIAL

## 2023-12-28 ENCOUNTER — TRANSCRIBE ORDERS (OUTPATIENT)
Dept: ADMINISTRATIVE | Facility: HOSPITAL | Age: 53
End: 2023-12-28
Payer: COMMERCIAL

## 2023-12-28 DIAGNOSIS — R91.1 SOLITARY PULMONARY NODULE: Primary | ICD-10-CM

## 2024-01-05 ENCOUNTER — HOSPITAL ENCOUNTER (OUTPATIENT)
Dept: CT IMAGING | Facility: HOSPITAL | Age: 54
Discharge: HOME | End: 2024-01-05
Admitting: FAMILY MEDICINE
Payer: COMMERCIAL

## 2024-01-05 DIAGNOSIS — R91.1 SOLITARY PULMONARY NODULE: ICD-10-CM

## 2024-01-05 PROCEDURE — 71250 CT THORAX DX C-: CPT

## 2024-02-15 ENCOUNTER — OFFICE VISIT (OUTPATIENT)
Dept: PODIATRY | Facility: CLINIC | Age: 54
End: 2024-02-15
Payer: COMMERCIAL

## 2024-02-15 VITALS
HEIGHT: 65 IN | OXYGEN SATURATION: 98 % | DIASTOLIC BLOOD PRESSURE: 77 MMHG | BODY MASS INDEX: 20.66 KG/M2 | TEMPERATURE: 97.5 F | WEIGHT: 124 LBS | SYSTOLIC BLOOD PRESSURE: 128 MMHG | HEART RATE: 60 BPM

## 2024-02-15 DIAGNOSIS — M79.671 FOOT PAIN, RIGHT: ICD-10-CM

## 2024-02-15 DIAGNOSIS — S92.424A CLOSED NONDISPLACED FRACTURE OF DISTAL PHALANX OF RIGHT GREAT TOE, INITIAL ENCOUNTER: Primary | ICD-10-CM

## 2024-02-15 RX ORDER — FLUOXETINE 20 MG/1
2 TABLET ORAL DAILY
COMMUNITY
Start: 2024-01-25

## 2024-10-08 ENCOUNTER — DOCUMENTATION (OUTPATIENT)
Dept: PHYSICAL THERAPY | Facility: CLINIC | Age: 54
End: 2024-10-08
Payer: COMMERCIAL

## 2024-10-08 NOTE — PROGRESS NOTES
Outpatient Physical Therapy  [address] [facility] [place], [place] [address]      Discharge Summary  Discharge Summary from Physical Therapy Report      Dates  PT visit: 9/6/23-9/15/23  Number of Visits: 3       Goals  Plan Goals: SHOULDER  PROBLEMS:      1. The patient has limited AROM of the L shoulder.                LTG 1: 12 weeks:  The patient will demonstrate 170 degrees of L shoulder flexion, 170 degrees of shoulder abduction, 80 degrees of shoulder external rotation, and 80 degrees of shoulder internal rotation to allow the patient to reach into upper kitchen cabinets and manipulate clothing behind the back with greater ease.                          STATUS:  New              STG 1a: 6 weeks:  The patient will demonstrate 130 degrees of L shoulder flexion, 120 degrees of shoulder abduction, 60 degrees of shoulder external rotation, and 50 degrees of shoulder internal rotation.                          STATUS:  New     2. The patient has limited strength of the L shoulder.              LTG 2: 12 weeks:  The patient will demonstrate 4 /5 strength for L shoulder flexion, abduction, external rotation, and internal rotation in order to demonstrate improved shoulder stability.                          STATUS:  New              STG 2a: 6 weeks:  The patient will demonstrate 3+ /5 strength for L shoulder flexion, abduction, external rotation, and internal rotation.                          STATUS:  New              STG2b:  6 weeks:  The patient will be independent with home exercises.                           STATUS:  New                3. The patient complains of pain to the L shoulder.              LTG 3: 12 weeks:  The patient will report a pain rating of 3 /10 or better at its worst in the past week, in order to improve sleep quality and tolerance to performance of activities of daily living.                          STATUS:  New              STG 3a: 6 weeks:  The patient will report a pain rating of 7 /10  or better at its worst in the past week.                          STATUS:  New     4. Carrying, Moving, and Handling Objects Functional Limitation                               LTG 4: 12 weeks:  The patient will demonstrate 20% dysfunction or less on the QuickDASH.                          STATUS:  New              STG 4a: 6 weeks:  The patient will demonstrate 50 % dysfunction or less on the QuickDASH.                            STATUS:  New     Discharge Plan: Continue with current home exercise program as instructed    Date of Discharge 10/8/2024      Electronically signed:   Shyla Daniel PTA  Physical Therapist Assistant  [facility] License #: [id number]

## 2025-04-25 ENCOUNTER — HOSPITAL ENCOUNTER (EMERGENCY)
Facility: HOSPITAL | Age: 55
Discharge: HOME | End: 2025-04-25
Attending: EMERGENCY MEDICINE
Payer: COMMERCIAL

## 2025-04-25 ENCOUNTER — APPOINTMENT (OUTPATIENT)
Dept: GENERAL RADIOLOGY | Facility: HOSPITAL | Age: 55
End: 2025-04-25
Payer: COMMERCIAL

## 2025-04-25 VITALS
DIASTOLIC BLOOD PRESSURE: 71 MMHG | SYSTOLIC BLOOD PRESSURE: 120 MMHG | WEIGHT: 128.31 LBS | HEART RATE: 55 BPM | RESPIRATION RATE: 18 BRPM | TEMPERATURE: 98 F | OXYGEN SATURATION: 100 % | HEIGHT: 65 IN | BODY MASS INDEX: 21.38 KG/M2

## 2025-04-25 DIAGNOSIS — R00.2 PALPITATIONS: Primary | ICD-10-CM

## 2025-04-25 DIAGNOSIS — E86.0 MILD DEHYDRATION: ICD-10-CM

## 2025-04-25 LAB
ALBUMIN SERPL-MCNC: 4.5 G/DL (ref 3.5–5.2)
ALBUMIN/GLOB SERPL: 1.7 G/DL
ALP SERPL-CCNC: 90 U/L (ref 39–117)
ALT SERPL W P-5'-P-CCNC: 16 U/L (ref 1–33)
ANION GAP SERPL CALCULATED.3IONS-SCNC: 12.6 MMOL/L (ref 5–15)
AST SERPL-CCNC: 22 U/L (ref 1–32)
BASOPHILS # BLD AUTO: 0.03 10*3/MM3 (ref 0–0.2)
BASOPHILS NFR BLD AUTO: 0.7 % (ref 0–1.5)
BILIRUB SERPL-MCNC: 0.2 MG/DL (ref 0–1.2)
BILIRUB UR QL STRIP: NEGATIVE
BUN SERPL-MCNC: 10 MG/DL (ref 6–20)
BUN/CREAT SERPL: 12.7 (ref 7–25)
CALCIUM SPEC-SCNC: 9.4 MG/DL (ref 8.6–10.5)
CHLORIDE SERPL-SCNC: 104 MMOL/L (ref 98–107)
CLARITY UR: CLEAR
CO2 SERPL-SCNC: 25.4 MMOL/L (ref 22–29)
COLOR UR: YELLOW
CREAT SERPL-MCNC: 0.79 MG/DL (ref 0.57–1)
DEPRECATED RDW RBC AUTO: 38.3 FL (ref 37–54)
EGFRCR SERPLBLD CKD-EPI 2021: 87.4 ML/MIN/1.73
EOSINOPHIL # BLD AUTO: 0.07 10*3/MM3 (ref 0–0.4)
EOSINOPHIL NFR BLD AUTO: 1.7 % (ref 0.3–6.2)
ERYTHROCYTE [DISTWIDTH] IN BLOOD BY AUTOMATED COUNT: 12.1 % (ref 12.3–15.4)
GLOBULIN UR ELPH-MCNC: 2.6 GM/DL
GLUCOSE SERPL-MCNC: 107 MG/DL (ref 65–99)
GLUCOSE UR STRIP-MCNC: NEGATIVE MG/DL
HCT VFR BLD AUTO: 38.3 % (ref 34–46.6)
HGB BLD-MCNC: 13.3 G/DL (ref 12–15.9)
HGB UR QL STRIP.AUTO: NEGATIVE
HOLD SPECIMEN: NORMAL
HOLD SPECIMEN: NORMAL
IMM GRANULOCYTES # BLD AUTO: 0.01 10*3/MM3 (ref 0–0.05)
IMM GRANULOCYTES NFR BLD AUTO: 0.2 % (ref 0–0.5)
KETONES UR QL STRIP: NEGATIVE
LEUKOCYTE ESTERASE UR QL STRIP.AUTO: NEGATIVE
LYMPHOCYTES # BLD AUTO: 1.77 10*3/MM3 (ref 0.7–3.1)
LYMPHOCYTES NFR BLD AUTO: 43.5 % (ref 19.6–45.3)
MAGNESIUM SERPL-MCNC: 2 MG/DL (ref 1.6–2.6)
MCH RBC QN AUTO: 29.9 PG (ref 26.6–33)
MCHC RBC AUTO-ENTMCNC: 34.7 G/DL (ref 31.5–35.7)
MCV RBC AUTO: 86.1 FL (ref 79–97)
MONOCYTES # BLD AUTO: 0.21 10*3/MM3 (ref 0.1–0.9)
MONOCYTES NFR BLD AUTO: 5.2 % (ref 5–12)
NEUTROPHILS NFR BLD AUTO: 1.98 10*3/MM3 (ref 1.7–7)
NEUTROPHILS NFR BLD AUTO: 48.7 % (ref 42.7–76)
NITRITE UR QL STRIP: NEGATIVE
NRBC BLD AUTO-RTO: 0 /100 WBC (ref 0–0.2)
PH UR STRIP.AUTO: 7 [PH] (ref 5–8)
PLATELET # BLD AUTO: 189 10*3/MM3 (ref 140–450)
PMV BLD AUTO: 10.4 FL (ref 6–12)
POTASSIUM SERPL-SCNC: 4.1 MMOL/L (ref 3.5–5.2)
PROT SERPL-MCNC: 7.1 G/DL (ref 6–8.5)
PROT UR QL STRIP: NEGATIVE
QT INTERVAL: 452 MS
QTC INTERVAL: 476 MS
RBC # BLD AUTO: 4.45 10*6/MM3 (ref 3.77–5.28)
SODIUM SERPL-SCNC: 142 MMOL/L (ref 136–145)
SP GR UR STRIP: <=1.005 (ref 1–1.03)
T4 FREE SERPL-MCNC: 1.47 NG/DL (ref 0.92–1.68)
TROPONIN T SERPL HS-MCNC: <6 NG/L
TSH SERPL DL<=0.05 MIU/L-ACNC: 5.8 UIU/ML (ref 0.27–4.2)
UROBILINOGEN UR QL STRIP: NORMAL
WBC NRBC COR # BLD AUTO: 4.07 10*3/MM3 (ref 3.4–10.8)
WHOLE BLOOD HOLD COAG: NORMAL
WHOLE BLOOD HOLD SPECIMEN: NORMAL

## 2025-04-25 PROCEDURE — 36415 COLL VENOUS BLD VENIPUNCTURE: CPT

## 2025-04-25 PROCEDURE — 71045 X-RAY EXAM CHEST 1 VIEW: CPT

## 2025-04-25 PROCEDURE — 84439 ASSAY OF FREE THYROXINE: CPT | Performed by: EMERGENCY MEDICINE

## 2025-04-25 PROCEDURE — 99284 EMERGENCY DEPT VISIT MOD MDM: CPT

## 2025-04-25 PROCEDURE — 83735 ASSAY OF MAGNESIUM: CPT | Performed by: EMERGENCY MEDICINE

## 2025-04-25 PROCEDURE — 80050 GENERAL HEALTH PANEL: CPT

## 2025-04-25 PROCEDURE — 25810000003 SODIUM CHLORIDE 0.9 % SOLUTION: Performed by: EMERGENCY MEDICINE

## 2025-04-25 PROCEDURE — 93005 ELECTROCARDIOGRAM TRACING: CPT | Performed by: EMERGENCY MEDICINE

## 2025-04-25 PROCEDURE — 81003 URINALYSIS AUTO W/O SCOPE: CPT | Performed by: EMERGENCY MEDICINE

## 2025-04-25 PROCEDURE — 93005 ELECTROCARDIOGRAM TRACING: CPT

## 2025-04-25 PROCEDURE — 84484 ASSAY OF TROPONIN QUANT: CPT | Performed by: EMERGENCY MEDICINE

## 2025-04-25 RX ORDER — SODIUM CHLORIDE 0.9 % (FLUSH) 0.9 %
10 SYRINGE (ML) INJECTION AS NEEDED
Status: DISCONTINUED | OUTPATIENT
Start: 2025-04-25 | End: 2025-04-25 | Stop reason: HOSPADM

## 2025-04-25 RX ADMIN — SODIUM CHLORIDE 1000 ML: 9 INJECTION, SOLUTION INTRAVENOUS at 01:59

## 2025-04-25 NOTE — ED PROVIDER NOTES
Time: 1:06 AM EDT  Date of encounter:  4/25/2025  Independent Historian/Clinical History and Information was obtained by:   Patient    History is limited by: N/A    Chief Complaint: Palpitations      History of Present Illness:  Patient is a 55 y.o. year old female who presents to the emergency department for evaluation of palpitations    Patient states for the past several weeks she has had numerous episodes of lightheadedness and dizziness with changing position.  States she has a diagnosis of POTS.  She is attempted to increase her sodium intake by adding salt to everything including her morning coffee.  States she had multiple episodes where she is felt jittery and shaky and had numerous episodes of palpitations.  Tonight she felt very shaky and tremulousness.  She treated with 0.25 mg of alprazolam and has feeling some relief currently.      Patient Care Team  Primary Care Provider: Fred Galvez MD    Past Medical History:     Allergies   Allergen Reactions    Amoxicillin-Pot Clavulanate Hives    Flonase [Fluticasone] Swelling    Latex Hives    Metronidazole Unknown - High Severity    Paxil [Paroxetine] Unknown - High Severity    Prednisone Anxiety     All oral and IM steroids    Wellbutrin [Bupropion] Anxiety     Past Medical History:   Diagnosis Date    Anxiety     Breast lump     De Quervain's disease (tenosynovitis) 02/28/2018    Right    Depression     Herpes     IBS (irritable bowel syndrome)     PVC (premature ventricular contraction)     Seasonal allergies     Toe pain, right      Past Surgical History:   Procedure Laterality Date    ARTHROPLASTY      Surgical Clips    BREAST SURGERY      COLONOSCOPY  07/2016    FOOT SURGERY Left     surgical repair 8/2021     Family History   Problem Relation Age of Onset    Cancer Mother         Unspecified    Osteoporosis Mother     Breast cancer Other     Diabetes Son         Unspecified type       Home Medications:  Prior to Admission medications   "  Medication Sig Start Date End Date Taking? Authorizing Provider   ALPRAZolam (XANAX) 0.5 MG tablet alprazolam 0.5 mg tablet    Jeffry Bolivar MD   FLUoxetine HCl, PMDD, 20 MG tablet Take 2 tablets by mouth Daily. 1/25/24   Jeffry Bolivar MD   ibuprofen (ADVIL,MOTRIN) 800 MG tablet Take 1 tablet by mouth 3 (Three) Times a Day As Needed (PAIN). 2/2/24   Melquiades Cardoza MD   LORazepam (ATIVAN) 1 MG tablet Take 1 tablet by mouth Every 8 (Eight) Hours As Needed for Anxiety. 1/12/23   Romulo Camarena MD   valACYclovir (VALTREX) 1000 MG tablet Take 1 tablet by mouth Daily. 7/19/22   Naomie Bellamy MD   vitamin D3 125 MCG (5000 UT) capsule capsule Take 1 capsule by mouth Daily.    ProviderJeffry MD        Social History:   Social History     Tobacco Use    Smoking status: Former    Smokeless tobacco: Never   Vaping Use    Vaping status: Never Used   Substance Use Topics    Alcohol use: Yes     Comment: occ    Drug use: Never         Review of Systems:  Review of Systems   Constitutional:  Negative for chills and fever.   HENT:  Negative for congestion, ear pain and sore throat.    Eyes:  Negative for pain.   Respiratory:  Negative for cough, chest tightness and shortness of breath.    Cardiovascular:  Positive for palpitations. Negative for chest pain.   Gastrointestinal:  Negative for abdominal pain, diarrhea, nausea and vomiting.   Genitourinary:  Negative for flank pain and hematuria.   Musculoskeletal:  Negative for joint swelling.   Skin:  Negative for pallor.   Neurological:  Positive for dizziness, tremors and light-headedness. Negative for seizures and headaches.   All other systems reviewed and are negative.       Physical Exam:  /71 (BP Location: Right arm, Patient Position: Lying)   Pulse 55   Temp 98 °F (36.7 °C) (Oral)   Resp 18   Ht 165.1 cm (65\")   Wt 58.2 kg (128 lb 4.9 oz)   SpO2 100%   BMI 21.35 kg/m²     Physical Exam  Vitals and nursing note reviewed. "   Constitutional:       General: She is not in acute distress.     Appearance: Normal appearance. She is not toxic-appearing.   HENT:      Head: Normocephalic and atraumatic.      Jaw: There is normal jaw occlusion.   Eyes:      General: Lids are normal.      Extraocular Movements: Extraocular movements intact.      Conjunctiva/sclera: Conjunctivae normal.      Pupils: Pupils are equal, round, and reactive to light.   Cardiovascular:      Rate and Rhythm: Normal rate and regular rhythm.      Pulses: Normal pulses.      Heart sounds: Normal heart sounds.   Pulmonary:      Effort: Pulmonary effort is normal. No respiratory distress.      Breath sounds: Normal breath sounds. No wheezing or rhonchi.   Abdominal:      General: Abdomen is flat.      Palpations: Abdomen is soft.      Tenderness: There is no abdominal tenderness. There is no guarding or rebound.   Musculoskeletal:         General: Normal range of motion.      Cervical back: Normal range of motion and neck supple.      Right lower leg: No edema.      Left lower leg: No edema.   Skin:     General: Skin is warm and dry.   Neurological:      Mental Status: She is alert and oriented to person, place, and time. Mental status is at baseline.   Psychiatric:         Mood and Affect: Mood normal.              Medical Decision Making:      Comorbidities that affect care:    PVC, anxiety, depression, IBS    External Notes reviewed:    Previous Clinic Note: Outpatient podiatry visit for closed nondisplaced fracture of the distal phalanx of the right great toe 2/15/2024      The following orders were placed and all results were independently analyzed by me:  Orders Placed This Encounter   Procedures    XR Chest 1 View    Castaner Draw    Comprehensive Metabolic Panel    Magnesium    High Sensitivity Troponin T    TSH Rfx On Abnormal To Free T4    CBC Auto Differential    T4, Free    Urinalysis With Culture If Indicated - Urine, Clean Catch    Ambulatory Referral to  Cardiology    Continuous Pulse Oximetry    ECG 12 Lead Tachycardia    CBC & Differential    Green Top (Gel)    Lavender Top    Gold Top - SST    Light Blue Top       Medications Given in the Emergency Department:  Medications   sodium chloride 0.9 % bolus 1,000 mL (0 mL Intravenous Stopped 4/25/25 0317)        ED Course:    ED Course as of 04/25/25 0631   Fri Apr 25, 2025   0107 My interpretation of EKG: Sinus rhythm 76, no acute ischemia, no ectopy [JS]      ED Course User Index  [JS] Nilson Blanton MD       Labs:    Lab Results (last 24 hours)       Procedure Component Value Units Date/Time    CBC & Differential [[id number]]  (Abnormal) Collected: 04/25/25 0048    Specimen: Blood from Arm, Right Updated: 04/25/25 0054    Narrative:      The following orders were created for panel order CBC & Differential.  Procedure                               Abnormality         Status                     ---------                               -----------         ------                     CBC Auto Differential[[id number]]        Abnormal            Final result                 Please view results for these tests on the individual orders.    Comprehensive Metabolic Panel [[id number]]  (Abnormal) Collected: 04/25/25 0048    Specimen: Blood from Arm, Right Updated: 04/25/25 0129     Glucose 107 mg/dL      BUN 10 mg/dL      Creatinine 0.79 mg/dL      Sodium 142 mmol/L      Potassium 4.1 mmol/L      Chloride 104 mmol/L      CO2 25.4 mmol/L      Calcium 9.4 mg/dL      Total Protein 7.1 g/dL      Albumin 4.5 g/dL      ALT (SGPT) 16 U/L      AST (SGOT) 22 U/L      Alkaline Phosphatase 90 U/L      Total Bilirubin 0.2 mg/dL      Globulin 2.6 gm/dL      A/G Ratio 1.7 g/dL      BUN/Creatinine Ratio 12.7     Anion Gap 12.6 mmol/L      eGFR 87.4 mL/min/1.73     Narrative:      GFR Categories in Chronic Kidney Disease (CKD)      GFR Category          GFR (mL/min/1.73)    Interpretation  G1                     90 or greater         Normal or  high (1)  G2                      60-89                Mild decrease (1)  G3a                   45-59                Mild to moderate decrease  G3b                   30-44                Moderate to severe decrease  G4                    15-29                Severe decrease  G5                    14 or less           Kidney failure          (1)In the absence of evidence of kidney disease, neither GFR category G1 or G2 fulfill the criteria for CKD.    eGFR calculation 2021 CKD-EPI creatinine equation, which does not include race as a factor    Magnesium [[id number]]  (Normal) Collected: 04/25/25 0048    Specimen: Blood from Arm, Right Updated: 04/25/25 0129     Magnesium 2.0 mg/dL     High Sensitivity Troponin T [[id number]]  (Normal) Collected: 04/25/25 0048    Specimen: Blood from Arm, Right Updated: 04/25/25 0136     HS Troponin T <6 ng/L     Narrative:      High Sensitive Troponin T Reference Range:  <14.0 ng/L- Negative Female for AMI  <22.0 ng/L- Negative Male for AMI  >=14 - Abnormal Female indicating possible myocardial injury.  >=22 - Abnormal Male indicating possible myocardial injury.   Clinicians would have to utilize clinical acumen, EKG, Troponin, and serial changes to determine if it is an Acute Myocardial Infarction or myocardial injury due to an underlying chronic condition.         TSH Rfx On Abnormal To Free T4 [[id number]]  (Abnormal) Collected: 04/25/25 0048    Specimen: Blood from Arm, Right Updated: 04/25/25 0136     TSH 5.800 uIU/mL     CBC Auto Differential [[id number]]  (Abnormal) Collected: 04/25/25 0048    Specimen: Blood from Arm, Right Updated: 04/25/25 0054     WBC 4.07 10*3/mm3      RBC 4.45 10*6/mm3      Hemoglobin 13.3 g/dL      Hematocrit 38.3 %      MCV 86.1 fL      MCH 29.9 pg      MCHC 34.7 g/dL      RDW 12.1 %      RDW-SD 38.3 fl      MPV 10.4 fL      Platelets 189 10*3/mm3      Neutrophil % 48.7 %      Lymphocyte % 43.5 %      Monocyte % 5.2 %      Eosinophil % 1.7 %      Basophil  % 0.7 %      Immature Grans % 0.2 %      Neutrophils, Absolute 1.98 10*3/mm3      Lymphocytes, Absolute 1.77 10*3/mm3      Monocytes, Absolute 0.21 10*3/mm3      Eosinophils, Absolute 0.07 10*3/mm3      Basophils, Absolute 0.03 10*3/mm3      Immature Grans, Absolute 0.01 10*3/mm3      nRBC 0.0 /100 WBC     T4, Free [[id number]]  (Normal) Collected: 04/25/25 0048    Specimen: Blood from Arm, Right Updated: 04/25/25 0226     Free T4 1.47 ng/dL     Urinalysis With Culture If Indicated - Urine, Clean Catch [[id number]]  (Normal) Collected: 04/25/25 0156    Specimen: Urine, Clean Catch Updated: 04/25/25 0259     Color, UA Yellow     Appearance, UA Clear     pH, UA 7.0     Specific Gravity, UA <=1.005     Glucose, UA Negative     Ketones, UA Negative     Bilirubin, UA Negative     Blood, UA Negative     Protein, UA Negative     Leuk Esterase, UA Negative     Nitrite, UA Negative     Urobilinogen, UA 0.2 E.U./dL    Narrative:      In absence of clinical symptoms, the presence of pyuria, bacteria, and/or nitrites on the urinalysis result does not correlate with infection.  Urine microscopic not indicated.             Imaging:    XR Chest 1 View  Result Date: 4/25/2025  XR CHEST 1 VW-  Date of exam: 4/25/2025 1:02 AM.  Comparisons: 1/5/2024; 1/12/2023.  INDICATIONS: 55-year-old female with history of cardiac palpitations.  FINDINGS: A single AP (or PA) upright portable chest radiograph was performed. Borderline cardiac enlargement is seen. No acute infiltrate is appreciated. Minimal biapical pleural-parenchymal scarring is suggested, greater on the left than the right. No pleural effusion or pneumothorax is identified. External artifacts obscure detail. Chronic calcified granulomatous disease involves the chest. There may be mild thoracolumbar scoliosis. No significant interval change is seen since the prior study (or studies).       No acute infiltrate is appreciated.    Portions of this note were completed with a voice  recognition program.  4/25/2025 2:05 AM by Tobias Peguero MD on Workstation: [id number]          Differential Diagnosis and Discussion:    Palpitations: Differential diagnosis includes but is not limited to anxiety, atrioventricular blocks, mitral valve disease, hypoxia, coronary artery disease, hypokalemia, anemia, fever, COPD, congestive heart failure, pericarditis, Toni-Parkinson-White syndrome, pulmonary embolism, SVT, atrial fibrillation, atrial flutter, sinus tachycardia, thyrotoxicosis, and pheochromocytoma.    PROCEDURES:    Labs were collected in the emergency department and all labs were reviewed and interpreted by me.  X-ray were performed in the emergency department and all X-ray impressions were independently interpreted by me.  An EKG was performed and the EKG was interpreted by me.    ECG 12 Lead Tachycardia   Preliminary Result   HEART RATE=67  bpm   RR Mitxcyre=578  ms   KS Interval=160  ms   P Horizontal Axis=-17  deg   P Front Axis=65  deg   QRSD Interval=91  ms   QT Mfvlzkpf=036  ms   ISeM=426  ms   QRS Axis=7  deg   T Wave Axis=28  deg   - ABNORMAL ECG -   Sinus rhythm   Anterior infarct, old   Date and Time of Study:2025-04-25 00:37:46          Procedures    MDM                     Patient Care Considerations:    NARCOTICS: I considered prescribing opiate pain medication as an outpatient, however no pain control required in the emergency department.      Consultants/Shared Management Plan:    None    Social Determinants of Health:    Patient has presented with family members who are responsible, reliable and will ensure follow up care.      Disposition and Care Coordination:    Discharged: The patient is suitable and stable for discharge with no need for consideration of admission.    I have explained the patient´s condition, diagnoses and treatment plan based on the information available to me at this time. I have answered questions and addressed any concerns. The patient has a good   understanding of the patient´s diagnosis, condition, and treatment plan as can be expected at this point. The vital signs have been stable. The patient´s condition is stable and appropriate for discharge from the emergency department.      The patient will pursue further outpatient evaluation with the primary care physician or other designated or consulting physician as outlined in the discharge instructions. They are agreeable to this plan of care and follow-up instructions have been explained in detail. The patient has received these instructions in written format and has expressed an understanding of the discharge instructions. The patient is aware that any significant change in condition or worsening of symptoms should prompt an immediate return to this or the closest emergency department or call to 911.  I have explained discharge medications and the need for follow up with the patient/caretakers. This was also printed in the discharge instructions. Patient was discharged with the following medications and follow up:      Medication List      No changes were made to your prescriptions during this visit.      Fred Galvez MD  [address] [place], [place] [id number] [phone]    Schedule an appointment as soon as possible for a visit          Final diagnoses:   Palpitations   Mild dehydration        ED Disposition       ED Disposition   Discharge    Condition   Stable    Comment   --               This medical record created using voice recognition software.             Nilson Blanton MD  04/25/25 [id number]

## 2025-05-01 LAB
QT INTERVAL: 452 MS
QTC INTERVAL: 476 MS

## 2025-05-27 PROBLEM — R00.2 PALPITATIONS: Status: ACTIVE | Noted: 2025-05-27

## 2025-06-17 ENCOUNTER — OFFICE VISIT (OUTPATIENT)
Dept: CARDIOLOGY | Facility: CLINIC | Age: 55
End: 2025-06-17
Payer: COMMERCIAL

## 2025-06-17 VITALS
SYSTOLIC BLOOD PRESSURE: 122 MMHG | WEIGHT: 116 LBS | BODY MASS INDEX: 19.33 KG/M2 | HEART RATE: 61 BPM | DIASTOLIC BLOOD PRESSURE: 72 MMHG | HEIGHT: 65 IN

## 2025-06-17 DIAGNOSIS — R00.2 PALPITATIONS: Primary | ICD-10-CM

## 2025-06-17 DIAGNOSIS — R94.31 ABNORMAL EKG: ICD-10-CM

## 2025-06-17 PROCEDURE — 99203 OFFICE O/P NEW LOW 30 MIN: CPT | Performed by: SPECIALIST
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
## 2025-06-20 ENCOUNTER — HOSPITAL ENCOUNTER (EMERGENCY)
Facility: HOSPITAL | Age: 55
Discharge: HOME | End: 2025-06-20
Attending: EMERGENCY MEDICINE
Payer: COMMERCIAL

## 2025-06-20 ENCOUNTER — APPOINTMENT (OUTPATIENT)
Dept: GENERAL RADIOLOGY | Facility: HOSPITAL | Age: 55
End: 2025-06-20
Payer: COMMERCIAL

## 2025-06-20 VITALS
HEART RATE: 57 BPM | DIASTOLIC BLOOD PRESSURE: 66 MMHG | SYSTOLIC BLOOD PRESSURE: 106 MMHG | WEIGHT: 116.4 LBS | BODY MASS INDEX: 19.39 KG/M2 | OXYGEN SATURATION: 99 % | HEIGHT: 65 IN | RESPIRATION RATE: 18 BRPM | TEMPERATURE: 98 F

## 2025-06-20 DIAGNOSIS — R00.2 PALPITATIONS: Primary | ICD-10-CM

## 2025-06-20 LAB
ALBUMIN SERPL-MCNC: 5 G/DL (ref 3.5–5.2)
ALBUMIN/GLOB SERPL: 3.1 G/DL
ALP SERPL-CCNC: 97 U/L (ref 39–117)
ALT SERPL W P-5'-P-CCNC: 19 U/L (ref 1–33)
ANION GAP SERPL CALCULATED.3IONS-SCNC: 16 MMOL/L (ref 5–15)
AST SERPL-CCNC: 24 U/L (ref 1–32)
BACTERIA UR QL AUTO: NORMAL /HPF
BASOPHILS # BLD AUTO: 0.03 10*3/MM3 (ref 0–0.2)
BASOPHILS NFR BLD AUTO: 0.8 % (ref 0–1.5)
BILIRUB SERPL-MCNC: 0.4 MG/DL (ref 0–1.2)
BILIRUB UR QL STRIP: NEGATIVE
BUN SERPL-MCNC: 20.9 MG/DL (ref 6–20)
BUN/CREAT SERPL: 21.1 (ref 7–25)
CALCIUM SPEC-SCNC: 9.1 MG/DL (ref 8.6–10.5)
CHLORIDE SERPL-SCNC: 101 MMOL/L (ref 98–107)
CLARITY UR: CLEAR
CO2 SERPL-SCNC: 23 MMOL/L (ref 22–29)
COLOR UR: YELLOW
CREAT SERPL-MCNC: 0.99 MG/DL (ref 0.57–1)
DEPRECATED RDW RBC AUTO: 41.3 FL (ref 37–54)
EGFRCR SERPLBLD CKD-EPI 2021: 66.6 ML/MIN/1.73
EOSINOPHIL # BLD AUTO: 0.08 10*3/MM3 (ref 0–0.4)
EOSINOPHIL NFR BLD AUTO: 2.1 % (ref 0.3–6.2)
ERYTHROCYTE [DISTWIDTH] IN BLOOD BY AUTOMATED COUNT: 12.8 % (ref 12.3–15.4)
GLOBULIN UR ELPH-MCNC: 1.6 GM/DL
GLUCOSE SERPL-MCNC: 79 MG/DL (ref 65–99)
GLUCOSE UR STRIP-MCNC: NEGATIVE MG/DL
HCT VFR BLD AUTO: 39.9 % (ref 34–46.6)
HGB BLD-MCNC: 13.4 G/DL (ref 12–15.9)
HGB UR QL STRIP.AUTO: NEGATIVE
HOLD SPECIMEN: NORMAL
HOLD SPECIMEN: NORMAL
HYALINE CASTS UR QL AUTO: NORMAL /LPF
IMM GRANULOCYTES # BLD AUTO: 0 10*3/MM3 (ref 0–0.05)
IMM GRANULOCYTES NFR BLD AUTO: 0 % (ref 0–0.5)
KETONES UR QL STRIP: ABNORMAL
LEUKOCYTE ESTERASE UR QL STRIP.AUTO: ABNORMAL
LYMPHOCYTES # BLD AUTO: 1.58 10*3/MM3 (ref 0.7–3.1)
LYMPHOCYTES NFR BLD AUTO: 41.9 % (ref 19.6–45.3)
MAGNESIUM SERPL-MCNC: 2.3 MG/DL (ref 1.6–2.6)
MCH RBC QN AUTO: 29.4 PG (ref 26.6–33)
MCHC RBC AUTO-ENTMCNC: 33.6 G/DL (ref 31.5–35.7)
MCV RBC AUTO: 87.5 FL (ref 79–97)
MONOCYTES # BLD AUTO: 0.29 10*3/MM3 (ref 0.1–0.9)
MONOCYTES NFR BLD AUTO: 7.7 % (ref 5–12)
NEUTROPHILS NFR BLD AUTO: 1.79 10*3/MM3 (ref 1.7–7)
NEUTROPHILS NFR BLD AUTO: 47.5 % (ref 42.7–76)
NITRITE UR QL STRIP: NEGATIVE
NRBC BLD AUTO-RTO: 0 /100 WBC (ref 0–0.2)
PH UR STRIP.AUTO: 7 [PH] (ref 5–8)
PLATELET # BLD AUTO: 167 10*3/MM3 (ref 140–450)
PMV BLD AUTO: 10.3 FL (ref 6–12)
POTASSIUM SERPL-SCNC: 4.4 MMOL/L (ref 3.5–5.2)
PROT SERPL-MCNC: 6.6 G/DL (ref 6–8.5)
PROT UR QL STRIP: NEGATIVE
QT INTERVAL: 385 MS
QTC INTERVAL: 478 MS
RBC # BLD AUTO: 4.56 10*6/MM3 (ref 3.77–5.28)
RBC # UR STRIP: NORMAL /HPF
REF LAB TEST METHOD: NORMAL
SODIUM SERPL-SCNC: 140 MMOL/L (ref 136–145)
SP GR UR STRIP: 1.02 (ref 1–1.03)
SQUAMOUS #/AREA URNS HPF: NORMAL /HPF
TROPONIN T SERPL HS-MCNC: <6 NG/L
UROBILINOGEN UR QL STRIP: ABNORMAL
WBC # UR STRIP: NORMAL /HPF
WBC NRBC COR # BLD AUTO: 3.77 10*3/MM3 (ref 3.4–10.8)
WHOLE BLOOD HOLD COAG: NORMAL
WHOLE BLOOD HOLD SPECIMEN: NORMAL

## 2025-06-20 PROCEDURE — 80053 COMPREHEN METABOLIC PANEL: CPT | Performed by: EMERGENCY MEDICINE

## 2025-06-20 PROCEDURE — 25810000003 SODIUM CHLORIDE 0.9 % SOLUTION: Performed by: EMERGENCY MEDICINE

## 2025-06-20 PROCEDURE — 85025 COMPLETE CBC W/AUTO DIFF WBC: CPT | Performed by: EMERGENCY MEDICINE

## 2025-06-20 PROCEDURE — 71045 X-RAY EXAM CHEST 1 VIEW: CPT

## 2025-06-20 PROCEDURE — 99284 EMERGENCY DEPT VISIT MOD MDM: CPT

## 2025-06-20 PROCEDURE — 83735 ASSAY OF MAGNESIUM: CPT | Performed by: EMERGENCY MEDICINE

## 2025-06-20 PROCEDURE — 84484 ASSAY OF TROPONIN QUANT: CPT | Performed by: EMERGENCY MEDICINE

## 2025-06-20 PROCEDURE — 93005 ELECTROCARDIOGRAM TRACING: CPT | Performed by: EMERGENCY MEDICINE

## 2025-06-20 PROCEDURE — 81001 URINALYSIS AUTO W/SCOPE: CPT | Performed by: EMERGENCY MEDICINE

## 2025-06-20 RX ORDER — SODIUM CHLORIDE 0.9 % (FLUSH) 0.9 %
10 SYRINGE (ML) INJECTION AS NEEDED
Status: DISCONTINUED | OUTPATIENT
Start: 2025-06-20 | End: 2025-06-20 | Stop reason: HOSPADM

## 2025-06-20 RX ADMIN — SODIUM CHLORIDE 1000 ML: 9 INJECTION, SOLUTION INTRAVENOUS at 03:05
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
## 2025-06-25 LAB
QT INTERVAL: 385 MS
QTC INTERVAL: 478 MS

## 2025-06-30 NOTE — NURSING NOTE
Called to remind patient of appointment. Told to arrive 15 min early and to wear comfortable clothes and shoes as she will be walking on a treadmill. Told to come in the main entrance of the new building to register and be sent up to the fourth floor.

## 2025-07-01 ENCOUNTER — HOSPITAL ENCOUNTER (OUTPATIENT)
Facility: HOSPITAL | Age: 55
Discharge: HOME | End: 2025-07-01
Admitting: SPECIALIST
Payer: COMMERCIAL

## 2025-07-01 DIAGNOSIS — R94.31 ABNORMAL EKG: ICD-10-CM

## 2025-07-01 PROCEDURE — 93325 DOPPLER ECHO COLOR FLOW MAPG: CPT

## 2025-07-01 PROCEDURE — 93320 DOPPLER ECHO COMPLETE: CPT

## 2025-07-01 PROCEDURE — 93350 STRESS TTE ONLY: CPT

## 2025-07-01 PROCEDURE — 93017 CV STRESS TEST TRACING ONLY: CPT

## 2025-07-02 ENCOUNTER — RESULTS FOLLOW-UP (OUTPATIENT)
Dept: CARDIOLOGY | Facility: CLINIC | Age: 55
End: 2025-07-02
Payer: COMMERCIAL

## 2025-07-02 DIAGNOSIS — R00.2 PALPITATIONS: Primary | ICD-10-CM

## 2025-07-02 LAB
AORTIC DIMENSIONLESS INDEX: 0.8 (DI)
ASCENDING AORTA: 2.8 CM
AV MEAN PRESS GRAD SYS DOP V1V2: 2.36 MMHG
AV VMAX SYS DOP: 107 CM/SEC
BH CV ECHO MEAS - ACS: 1.51 CM
BH CV ECHO MEAS - AO MAX PG: 4.6 MMHG
BH CV ECHO MEAS - AO ROOT DIAM: 2.5 CM
BH CV ECHO MEAS - AO V2 VTI: 20.9 CM
BH CV ECHO MEAS - AVA(I,D): 2.23 CM2
BH CV ECHO MEAS - EDV(CUBED): 53.2 ML
BH CV ECHO MEAS - EDV(MOD-SP2): 66.5 ML
BH CV ECHO MEAS - EDV(MOD-SP4): 70.4 ML
BH CV ECHO MEAS - EF(MOD-SP2): 65.1 %
BH CV ECHO MEAS - EF(MOD-SP4): 61.4 %
BH CV ECHO MEAS - ESV(CUBED): 17.5 ML
BH CV ECHO MEAS - ESV(MOD-SP2): 23.2 ML
BH CV ECHO MEAS - ESV(MOD-SP4): 27.2 ML
BH CV ECHO MEAS - FS: 31 %
BH CV ECHO MEAS - IVS/LVPW: 1.01 CM
BH CV ECHO MEAS - IVSD: 0.74 CM
BH CV ECHO MEAS - LA DIMENSION: 2.38 CM
BH CV ECHO MEAS - LAT PEAK E' VEL: 8.8 CM/SEC
BH CV ECHO MEAS - LV DIASTOLIC VOL/BSA (35-75): 44.9 CM2
BH CV ECHO MEAS - LV MASS(C)D: 75.2 GRAMS
BH CV ECHO MEAS - LV MAX PG: 2.33 MMHG
BH CV ECHO MEAS - LV MEAN PG: 1.13 MMHG
BH CV ECHO MEAS - LV SYSTOLIC VOL/BSA (12-30): 17.3 CM2
BH CV ECHO MEAS - LV V1 MAX: 76.3 CM/SEC
BH CV ECHO MEAS - LV V1 VTI: 16.7 CM
BH CV ECHO MEAS - LVIDD: 3.8 CM
BH CV ECHO MEAS - LVIDS: 2.6 CM
BH CV ECHO MEAS - LVOT AREA: 2.8 CM2
BH CV ECHO MEAS - LVOT DIAM: 1.89 CM
BH CV ECHO MEAS - LVPWD: 0.73 CM
BH CV ECHO MEAS - MED PEAK E' VEL: 8.6 CM/SEC
BH CV ECHO MEAS - MR MAX PG: 85.2 MMHG
BH CV ECHO MEAS - MR MAX VEL: 461.5 CM/SEC
BH CV ECHO MEAS - MV A MAX VEL: 33.4 CM/SEC
BH CV ECHO MEAS - MV DEC SLOPE: 230.2 CM/SEC2
BH CV ECHO MEAS - MV DEC TIME: 0.26 SEC
BH CV ECHO MEAS - MV E MAX VEL: 64.3 CM/SEC
BH CV ECHO MEAS - MV E/A: 1.92
BH CV ECHO MEAS - MV MAX PG: 2.6 MMHG
BH CV ECHO MEAS - MV MEAN PG: 1.08 MMHG
BH CV ECHO MEAS - MV P1/2T: 106.6 MSEC
BH CV ECHO MEAS - MV V2 VTI: 28 CM
BH CV ECHO MEAS - MVA(P1/2T): 2.06 CM2
BH CV ECHO MEAS - MVA(VTI): 1.67 CM2
BH CV ECHO MEAS - PA V2 MAX: 74.7 CM/SEC
BH CV ECHO MEAS - RAP SYSTOLE: 3 MMHG
BH CV ECHO MEAS - RVDD: 2.7 CM
BH CV ECHO MEAS - RVSP: 29.3 MMHG
BH CV ECHO MEAS - SV(LVOT): 46.7 ML
BH CV ECHO MEAS - SV(MOD-SP2): 43.3 ML
BH CV ECHO MEAS - SV(MOD-SP4): 43.2 ML
BH CV ECHO MEAS - SVI(LVOT): 29.8 ML/M2
BH CV ECHO MEAS - SVI(MOD-SP2): 27.6 ML/M2
BH CV ECHO MEAS - SVI(MOD-SP4): 27.5 ML/M2
BH CV ECHO MEAS - TAPSE (>1.6): 2.04 CM
BH CV ECHO MEAS - TR MAX PG: 26.3 MMHG
BH CV ECHO MEAS - TR MAX VEL: 256.5 CM/SEC
BH CV ECHO MEASUREMENTS AVERAGE E/E' RATIO: 7.39
BH CV IMMEDIATE POST RECOVERY TECH DATA SYMPTOMS: NORMAL
BH CV IMMEDIATE POST TECH DATA BLOOD PRESSURE: NORMAL MMHG
BH CV IMMEDIATE POST TECH DATA HEART RATE: 152 BPM
BH CV IMMEDIATE POST TECH DATA OXYGEN SATS: 98 %
BH CV NINE MINUTE RECOVERY TECH DATA BLOOD PRESSURE: NORMAL MMHG
BH CV NINE MINUTE RECOVERY TECH DATA HEART RATE: 94 BPM
BH CV NINE MINUTE RECOVERY TECH DATA SYMPTOMS: NORMAL
BH CV SIX MINUTE RECOVERY TECH DATA BLOOD PRESSURE: NORMAL
BH CV SIX MINUTE RECOVERY TECH DATA HEART RATE: 99 BPM
BH CV SIX MINUTE RECOVERY TECH DATA SYMPTOMS: NORMAL
BH CV STRESS BP STAGE 1: NORMAL
BH CV STRESS BP STAGE 2: NORMAL
BH CV STRESS BP STAGE 3: NORMAL
BH CV STRESS DURATION MIN STAGE 1: 3
BH CV STRESS DURATION MIN STAGE 2: 3
BH CV STRESS DURATION MIN STAGE 3: 3
BH CV STRESS DURATION SEC STAGE 1: 0
BH CV STRESS DURATION SEC STAGE 2: 0
BH CV STRESS DURATION SEC STAGE 3: 0
BH CV STRESS ECHO POST STRESS EJECTION FRACTION EF: 75 %
BH CV STRESS GRADE STAGE 1: 10
BH CV STRESS GRADE STAGE 2: 12
BH CV STRESS GRADE STAGE 3: 14
BH CV STRESS HR STAGE 1: 119
BH CV STRESS HR STAGE 2: 122
BH CV STRESS HR STAGE 3: 150
BH CV STRESS METS STAGE 1: 5
BH CV STRESS METS STAGE 2: 7.5
BH CV STRESS METS STAGE 3: 10
BH CV STRESS PROTOCOL 1: NORMAL
BH CV STRESS RECOVERY BP: 106 MMHG
BH CV STRESS RECOVERY HR: 94 BPM
BH CV STRESS RECOVERY O2: 99 %
BH CV STRESS SPEED STAGE 1: 1.7
BH CV STRESS SPEED STAGE 2: 2.5
BH CV STRESS SPEED STAGE 3: 3.4
BH CV STRESS STAGE 1: 1
BH CV STRESS STAGE 2: 2
BH CV STRESS STAGE 3: 3
BH CV THREE MINUTE POST TECH DATA BLOOD PRESSURE: NORMAL MMHG
BH CV THREE MINUTE POST TECH DATA HEART RATE: 89 BPM
BH CV THREE MINUTE RECOVERY TECH DATA SYMPTOM: NORMAL
BH CV XLRA - RV BASE: 3.7 CM
BH CV XLRA - RV MID: 2.36 CM
BH CV XLRA - TDI S': 10.4 CM/SEC
IVRT: 70 MS
LEFT ATRIUM VOLUME INDEX: 16.2 ML/M2
LV EF BIPLANE MOD: 63.9 %
MAXIMAL PREDICTED HEART RATE: 163 BPM
PERCENT MAX PREDICTED HR: 94.48 %
STRESS BASELINE BP: NORMAL MMHG
STRESS BASELINE HR: 65 BPM
STRESS O2 SAT REST: 99 %
STRESS PERCENT HR: 111 %
STRESS POST O2 SAT PEAK: 99 %
STRESS POST PEAK BP: NORMAL MMHG
STRESS POST PEAK HR: 154 BPM
STRESS TARGET HR: 139 BPM

## 2025-07-02 NOTE — TELEPHONE ENCOUNTER
Per Amy:  Normal stress echo consistent with a low risk study for myocardial ischemia.   No significant valve abnormality noted  Follow up as scheduled, notify office of any new/worsening cardiac symptoms.    Spoke with patient, patient is aware and verbalized understanding.   Patient states she continues to have PVC's, holter monitor is not scheduled until 7/31/25, please advise.

## 2025-07-03 NOTE — TELEPHONE ENCOUNTER
"Received VM from patient. Patient requesting CT calcium score since stress test was negative and a \"detailed metabolic workup\". Holter appointment rescheduled for 7/7.  "

## 2025-07-10 ENCOUNTER — RESULTS FOLLOW-UP (OUTPATIENT)
Dept: CARDIOLOGY | Facility: CLINIC | Age: 55
End: 2025-07-10
Payer: COMMERCIAL

## 2025-07-10 NOTE — TELEPHONE ENCOUNTER
Patient returned call, verbalized understanding.   Patient asked about when her cardiac calcium score was going to be scheduled, transferred her to [place].

## 2025-07-10 NOTE — TELEPHONE ENCOUNTER
Per Amy:  Holter monitor shows normal sinus rhythm with average heart rate 70 bpm. No significant arrhythmias noted. Continue current medications. Follow-up as scheduled.     Left message for patient to call office.

## 2025-07-11 ENCOUNTER — HOSPITAL ENCOUNTER (OUTPATIENT)
Dept: CT IMAGING | Facility: HOSPITAL | Age: 55
Discharge: HOME | End: 2025-07-11
Admitting: NURSE PRACTITIONER
Payer: COMMERCIAL

## 2025-07-11 DIAGNOSIS — R94.31 ABNORMAL EKG: ICD-10-CM

## 2025-07-11 PROCEDURE — 75571 CT HRT W/O DYE W/CA TEST: CPT

## 2025-07-31 ENCOUNTER — OFFICE VISIT (OUTPATIENT)
Dept: CARDIOLOGY | Facility: CLINIC | Age: 55
End: 2025-07-31
Payer: COMMERCIAL

## 2025-07-31 VITALS
HEIGHT: 65 IN | WEIGHT: 112.2 LBS | HEART RATE: 70 BPM | BODY MASS INDEX: 18.69 KG/M2 | DIASTOLIC BLOOD PRESSURE: 56 MMHG | SYSTOLIC BLOOD PRESSURE: 107 MMHG

## 2025-07-31 DIAGNOSIS — I49.3 PVC'S (PREMATURE VENTRICULAR CONTRACTIONS): Primary | ICD-10-CM

## 2025-07-31 PROBLEM — R91.1 LUNG NODULE: Status: ACTIVE | Noted: 2023-12-27

## 2025-07-31 PROBLEM — K58.9 IBS (IRRITABLE BOWEL SYNDROME): Status: ACTIVE | Noted: 2025-07-31

## 2025-07-31 PROBLEM — R79.89 LOW VITAMIN D LEVEL: Status: ACTIVE | Noted: 2019-01-03

## 2025-07-31 PROBLEM — R00.2 PALPITATIONS: Status: RESOLVED | Noted: 2025-05-27 | Resolved: 2025-07-31

## 2025-07-31 PROBLEM — N63.0 BREAST LUMP: Status: ACTIVE | Noted: 2025-07-31

## 2025-07-31 PROBLEM — M65.4 RADIAL STYLOID TENOSYNOVITIS: Status: ACTIVE | Noted: 2018-02-28

## 2025-07-31 PROBLEM — F41.9 ANXIETY: Status: ACTIVE | Noted: 2025-07-31

## 2025-07-31 PROBLEM — S43.432A SUPERIOR GLENOID LABRUM LESION OF LEFT SHOULDER: Status: ACTIVE | Noted: 2023-09-26

## 2025-07-31 PROBLEM — J30.2 SEASONAL ALLERGIC RHINITIS: Status: ACTIVE | Noted: 2025-07-31

## 2025-07-31 PROBLEM — A04.72 CLOSTRIDIUM DIFFICILE COLITIS: Status: RESOLVED | Noted: 2019-05-03 | Resolved: 2025-07-31

## 2025-07-31 PROBLEM — F32.A CHRONIC DEPRESSION: Status: ACTIVE | Noted: 2017-02-07

## 2025-07-31 PROBLEM — E03.9 HYPOTHYROIDISM: Status: ACTIVE | Noted: 2017-02-07

## 2025-07-31 PROBLEM — S92.309A METATARSAL BONE FRACTURE: Status: ACTIVE | Noted: 2021-11-22

## 2025-07-31 PROCEDURE — 99213 OFFICE O/P EST LOW 20 MIN: CPT | Performed by: NURSE PRACTITIONER

## 2025-07-31 RX ORDER — IODINE SOLUTION STRONG 5% (LUGOL'S) 5 %
0.2 SOLUTION ORAL DAILY
COMMUNITY
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
